# Patient Record
Sex: MALE | Race: WHITE | NOT HISPANIC OR LATINO | Employment: OTHER | URBAN - METROPOLITAN AREA
[De-identification: names, ages, dates, MRNs, and addresses within clinical notes are randomized per-mention and may not be internally consistent; named-entity substitution may affect disease eponyms.]

---

## 2017-01-09 ENCOUNTER — OFFICE VISIT (OUTPATIENT)
Dept: FAMILY MEDICINE | Facility: CLINIC | Age: 62
End: 2017-01-09

## 2017-01-09 VITALS
WEIGHT: 254 LBS | DIASTOLIC BLOOD PRESSURE: 76 MMHG | BODY MASS INDEX: 36.36 KG/M2 | RESPIRATION RATE: 20 BRPM | HEART RATE: 105 BPM | HEIGHT: 70 IN | OXYGEN SATURATION: 94 % | SYSTOLIC BLOOD PRESSURE: 128 MMHG | TEMPERATURE: 98.1 F

## 2017-01-09 DIAGNOSIS — M62.830 BACK MUSCLE SPASM: ICD-10-CM

## 2017-01-09 DIAGNOSIS — R07.0 THROAT PAIN: Primary | ICD-10-CM

## 2017-01-09 LAB — S PYO AG THROAT QL IA.RAPID: NORMAL

## 2017-01-09 PROCEDURE — 87880 STREP A ASSAY W/OPTIC: CPT | Performed by: PHYSICIAN ASSISTANT

## 2017-01-09 PROCEDURE — 99213 OFFICE O/P EST LOW 20 MIN: CPT | Performed by: PHYSICIAN ASSISTANT

## 2017-01-09 PROCEDURE — 87070 CULTURE OTHR SPECIMN AEROBIC: CPT | Performed by: PHYSICIAN ASSISTANT

## 2017-01-09 RX ORDER — CYCLOBENZAPRINE HCL 5 MG
5 TABLET ORAL 3 TIMES DAILY PRN
Qty: 30 TABLET | Refills: 0 | Status: SHIPPED | OUTPATIENT
Start: 2017-01-09 | End: 2018-04-23

## 2017-01-09 NOTE — Clinical Note
[facility]  [place] [facility] [address]  [place]  [address]  [phone]              Return to  Work Release    Date: 1/9/2017      Name: Sarthak Berrios                       YOB: 1955    The patient was seen at [facility]    Please excuse from work responsibilities 1/6 and 1/7.    He may return to work without restrictions 1/9/2017              _________________________  Flakita Rodriguez PA-C

## 2017-01-09 NOTE — NURSING NOTE
Sarthak Berrios is here for a sore throat and also diarrhea.    Questioned patient about current smoking habits.  Pt. has never smoked.  Body mass index is 36.2 kg/(m^2).  BP Cuff right  Arm  L  Cuff  PULSE regular  My Chart: active  CLASSIFICATION OF OVERWEIGHT AND OBESITY BY BMI                        Obesity Class           BMI(kg/m2)  Underweight                                    < 18.5  Normal                                         18.5-24.9  Overweight                                     25.0-29.9  OBESITY                     I                  30.0-34.9                             II                 35.0-39.9  EXTREME OBESITY             III                >40                            Patient's  BMI Body mass index is 36.2 kg/(m^2).  http://hin.nhlbi.nih.gov/menuplanner/menu.cgi    Pre-visit planning  Immunizations - up to date  Colonoscopy - is up to date  Mammogram -   Asthma -   PHQ9 -    TIM-7 -

## 2017-01-09 NOTE — PROGRESS NOTES
"CC: Throat pain, diarrhea    History:  Sarthak and his wife both came home Friday from work feeling not well. Throat pain from 2/5 with 5 being the worst pain. Had diarrhea at first, but this has resolved. Some low back pain with coughing. Has been coughing, mostly clear phlegm. No fevers, sweats, chills. Some nasal drainage, especially at night. Sarthak was here to see Dr. Styles 12/1 with the same symptoms. Was recommended to use albuterol and Flovent. Today he returned with the same symptoms.     Both had a flu shot.     Has been using Nyquil and Dayquil. Ibuprofen more for headaches.     PMH, MEDICATIONS, ALLERGIES, SOCIAL AND FAMILY HISTORY in [facility] and reviewed by me personally.      ROS negative other than the symptoms noted above in the HPI.        Examination   /76 mmHg  Pulse 105  Temp(Src) 98.1  F (36.7  C) (Oral)  Resp 20  Ht 1.784 m (5' 10.25\")  Wt 115.214 kg (254 lb)  BMI 36.20 kg/m2  SpO2 94%       Constitutional: Sitting comfortably, in no acute distress. Vital signs noted  Eyes: pupils equal round reactive to light and accomodation, extra ocular movements intact  Ears: external canals and TMs free of abnormalities  Nose: patent, without mucosal abnormalities  Mouth and throat: without erythema or lesions of the mucosa  Neck:  no adenopathy, trachea midline and normal to palpation  Cardiovascular:  regular rate and rhythm, no murmurs, clicks, or gallops  Respiratory:  normal respiratory rate and rhythm, lungs clear to auscultation  SKIN: No jaundice/pallor/rash.   Psychiatric: mentation appears normal and affect normal/bright        A/P    ICD-10-CM    1. Throat pain R07.0 RAPID STREP (BFP)     THROAT CULTURE (BFP)   2. Back muscle spasm M62.830 cyclobenzaprine (FLEXERIL) 5 MG tablet       DISCUSSION. Rapid strep test negative today. Will culture to confirm. Recommended he continue using over the counter cough suppressants and he should also use albuterol as needed. I also gave him some Flexeril " to use to help with his lower back spasm.     follow up visit: As needed    Flakita Rodriguez PA-C  [facility]

## 2017-01-11 LAB — THROAT CULTURE: NORMAL

## 2017-10-11 DIAGNOSIS — J45.30 MILD PERSISTENT ASTHMA WITHOUT COMPLICATION: ICD-10-CM

## 2017-10-11 RX ORDER — FLUTICASONE PROPIONATE 110 UG/1
2 AEROSOL, METERED RESPIRATORY (INHALATION) 2 TIMES DAILY
Qty: 1 INHALER | Refills: 0 | Status: SHIPPED | OUTPATIENT
Start: 2017-10-11 | End: 2018-04-23

## 2017-10-11 NOTE — TELEPHONE ENCOUNTER
Patient called stating that Dr. Styles told him that if he ever needs refills of his inhalers to just call and he will refill them without asking him to come and be seen.  I informed him that since it has been almost a year there is a chance that he might have to come to be seen because of the amount of time since his last visit for this condition. He says that it would be way too hard to come in and that he just doesn't have the time and he only gets 1 weekday off a week. I told him I would send the request to Dr. Styles and see what he decides and I would call and LVM on his phone letting him know what Dr. Styles says.    Pending Prescriptions:                       Disp   Refills    fluticasone (FLOVENT HFA) 110 MCG/ACT Inh*1 Inha*0            Sig: Inhale 2 puffs into the lungs 2 times daily    Dr. Styles please review and advise.    Thank You,  Naomy Seo CMA        Ok to LVM on patient's phone letting him know what Dr. Styles says.  Phone: [phone]

## 2017-10-11 NOTE — TELEPHONE ENCOUNTER
Spoke with patient and informed him that we sent a refill but this is something that he needs to be seen once a year for. He is ok with this and plans on coming for a full physical sometime soon, he was just not able to come for a little while due to his schedule being so busy.    Patient Call Back Info:  [phone] (home)     Naomy Seo [facility]

## 2017-11-18 ENCOUNTER — NURSE TRIAGE (OUTPATIENT)
Dept: NURSING | Facility: CLINIC | Age: 62
End: 2017-11-18

## 2017-11-20 ENCOUNTER — OFFICE VISIT (OUTPATIENT)
Dept: FAMILY MEDICINE | Facility: CLINIC | Age: 62
End: 2017-11-20

## 2017-11-20 VITALS
RESPIRATION RATE: 16 BRPM | SYSTOLIC BLOOD PRESSURE: 138 MMHG | OXYGEN SATURATION: 98 % | HEART RATE: 76 BPM | DIASTOLIC BLOOD PRESSURE: 88 MMHG | TEMPERATURE: 98 F

## 2017-11-20 DIAGNOSIS — E66.09 OBESITY DUE TO EXCESS CALORIES WITHOUT SERIOUS COMORBIDITY, UNSPECIFIED CLASSIFICATION: ICD-10-CM

## 2017-11-20 DIAGNOSIS — Z96.653 HISTORY OF TOTAL BILATERAL KNEE REPLACEMENT: ICD-10-CM

## 2017-11-20 DIAGNOSIS — S33.8XXA SPRAIN OF ILIOLUMBAR LIGAMENT, INITIAL ENCOUNTER: Primary | ICD-10-CM

## 2017-11-20 PROCEDURE — 99214 OFFICE O/P EST MOD 30 MIN: CPT | Performed by: FAMILY MEDICINE

## 2017-11-20 NOTE — NURSING NOTE
Patient is here for low back pain - This started Friday am - needs a note for work.  Pre-Visit Screening not done today.  Pulse - regular  My Chart - accepts    CLASSIFICATION OF OVERWEIGHT AND OBESITY BY BMI                         Obesity Class           BMI(kg/m2)  Underweight                                    < 18.5  Normal                                         18.5-24.9  Overweight                                     25.0-29.9  OBESITY                     I                  30.0-34.9                              II                 35.0-39.9  EXTREME OBESITY             III                >40                             Patient's  BMI There is no height or weight on file to calculate BMI.  http://hin.nhlbi.nih.gov/menuplanner/menu.cgi  Questioned patient about current smoking habits.  Pt. has never smoked.  The patient has verbalized that it is ok to leave a detailed voice message on the patient's cell phone with results/recommendations from this visit.

## 2017-11-20 NOTE — PATIENT INSTRUCTIONS
Ice    Ibuprofen    Flexeril    Rehab stretches/ exercises to begin immediately and given in writing with illustrations.    Schedule PT for strengthening  Weight loss

## 2017-11-20 NOTE — LETTER
November 20, 2017      Sarthak Berrios  [address] [address]        To Whom It May Concern:    Sarthak Berrios  was seen on 11/20/2017 for our first visit together. He has been struggling with low back pain severe onset Friday 11/17/2017.     I evaluated his back pain today and started a therapy plan.  Please excuse him  until Saturday November 25 due to injury. He can return to work at that time without restrictions.        Sincerely,        Ro Hernandes MD

## 2017-11-20 NOTE — MR AVS SNAPSHOT
After Visit Summary   11/20/2017    Sarthak Berrios    MRN: [id number]           Patient Information     Date Of Birth          1955        Visit Information        Provider Department      11/20/2017 12:30 PM Ro Hernandes MD [facility]        Today's Diagnoses     Sprain of iliolumbar ligament, initial encounter    -  1    Obesity due to excess calories without serious comorbidity, unspecified classification          Care Instructions    Ice    Ibuprofen    Flexeril    Rehab stretches/ exercises to begin immediately and given in writing with illustrations.    Schedule PT for strengthening  Weight loss          Follow-ups after your visit        Additional Services     [facility] PT, HAND, AND CHIROPRACTIC REFERRAL       **This order will print in the [facility] Scheduling Office**    Physical Therapy, Hand Therapy and Chiropractic Care are available through:    *[facility]  *[facility]  *[facility]    Call one number to schedule at any of the above locations: [phone].    Your provider has referred you to: [facility] - PT and/or Chiropractic Care determined by clinical presentation at [facility] or [facility] Initial Visit    Indication/Reason for Referral: Low Back Pain  Onset of Illness: repetitive issue/ last incident Friday 11/17/2017 with minimal injury  Therapy Orders: Evaluate and Treat  Special Programs: None  Special Request: None      Additional Comments for the Therapist or Chiropractor: Back strengthening program/ weight loss    Please be aware that coverage of these services is subject to the terms and limitations of your health insurance plan.  Call member services at your health plan with any benefit or coverage questions.      Please bring the following to your appointment:    *Your personal calendar for scheduling future appointments  *Comfortable clothing                  Who to contact     If you have  questions or need follow up information about today's clinic visit or your schedule please contact [facility] directly at [phone].  Normal or non-critical lab and imaging results will be communicated to you by MyChart, letter or phone within 4 business days after the clinic has received the results. If you do not hear from us within 7 days, please contact the clinic through MyChart or phone. If you have a critical or abnormal lab result, we will notify you by phone as soon as possible.  Submit refill requests through [employer] or call your pharmacy and they will forward the refill request to us. Please allow 3 business days for your refill to be completed.          Additional Information About Your Visit        PrePayMeharFood on the Table Information     [employer] gives you secure access to your electronic health record. If you see a primary care provider, you can also send messages to your care team and make appointments. If you have questions, please call your primary care clinic.  If you do not have a primary care provider, please call [phone] and they will assist you.        Care EveryWhere ID     This is your Care EveryWhere ID. This could be used by other organizations to access your [place] medical records  [id number]        Your Vitals Were     Pulse Temperature Respirations Pulse Oximetry          76 98  F (36.7  C) (Oral) 16 98%         Blood Pressure from Last 3 Encounters:   11/20/17 138/88   01/09/17 128/76   12/01/16 118/82    Weight from Last 3 Encounters:   01/09/17 115.2 kg (254 lb)   12/01/16 115.6 kg (254 lb 12.8 oz)   11/17/15 112.9 kg (249 lb)              We Performed the Following     [facility] PT, HAND, AND CHIROPRACTIC REFERRAL        Primary Care Provider Office Phone # Fax #    Paul Styles [phone]       [address] [address]        Equal Access to Services     SUSY DAO : chepe Garcia,  joann carbajal erikhugo butleraakarolyn ah. So [facility] [phone].    ATENCIÓN: Si disha kaiser, tiene a davis disposición servicios gratuitos de asistencia lingüística. Morales al [phone].    We comply with applicable federal civil rights laws and [place] laws. We do not discriminate on the basis of race, color, national origin, age, disability, sex, sexual orientation, or gender identity.            Thank you!     Thank you for choosing [facility]  for your care. Our goal is always to provide you with excellent care. Hearing back from our patients is one way we can continue to improve our services. Please take a few minutes to complete the written survey that you may receive in the mail after your visit with us. Thank you!             Your Updated Medication List - Protect others around you: Learn how to safely use, store and throw away your medicines at www.disposemymeds.org.          This list is accurate as of: 11/20/17  2:03 PM.  Always use your most recent med list.                   Brand Name Dispense Instructions for use Diagnosis    albuterol 108 (90 BASE) MCG/ACT Inhaler    PROAIR HFA/PROVENTIL HFA/VENTOLIN HFA    1 Inhaler    Inhale 2 puffs into the lungs every 6 hours as needed for shortness of breath / dyspnea    Mild persistent asthma without complication       cyclobenzaprine 5 MG tablet    FLEXERIL    30 tablet    Take 1 tablet (5 mg) by mouth 3 times daily as needed for muscle spasms (Take 1 tablet by mouth as need for muscle pain)    Back muscle spasm       fluticasone 110 MCG/ACT Inhaler    FLOVENT HFA    1 Inhaler    Inhale 2 puffs into the lungs 2 times daily    Mild persistent asthma without complication       ibuprofen 800 MG tablet    ADVIL/MOTRIN    30 tablet    Take 1 tablet by mouth every 8 hours as needed for pain.    Sprain of lumbar region       Multi-vitamin Tabs tablet      Take 1 tablet by mouth daily

## 2017-11-20 NOTE — PROGRESS NOTES
SUBJECTIVE:   The patients complains of low back pain for 3 days, positional with bending or lifting, without radiation down the legs. Precipitating factors: putting his socks on had a left sided back twinge. So sore iced, took flexeril and stayed in bed. Ibuprofen as well/ chiropractor in the past.    Called ER at [facility] who recommended he come in/ did not do that. Started walking on Saturday and felt better. Missed 3 days of work and need note for missed work  Prior history of back problems: one a year wrong move with a muscular issue/ no imaging. Lasts 2-4 days and does well. There is no numbness/tingling in the right or left leg.    Recent knee replacement doing well.  Asthma issues with cold weather.     for work.           OBJECTIVE:  Appears well, in no apparent distress.  Protuberant abdomen with obesity.  Vital signs are normal. Lumbo-sacral spine area reveals no local tenderness or mass.  Painful and reduced LS ROM noted. Straight leg raise is negative at 90 degrees on right and left. DTR's, motor strength and sensation normal, including heel and toe gait.  Peripheral pulses are palpable. Lumbar spine X-Ray are not indicated.    ASSESSMENT:   Lumbar strain    PLAN:  Discussed treatment plan of prn NSAID's and back care exercise program, given in writing. Intermittent rest, proper lifting with avoidance of heavy lifting and intermittent use of heat discussed - avoid sleeping on a heating pad. Consider Physical Therapy now due to recurrence issues. Xray studies if not improving. Call or return to clinic prn if these symptoms worsen or fail to improve as anticipated. See note for work.

## 2017-12-04 DIAGNOSIS — J45.30 MILD PERSISTENT ASTHMA WITHOUT COMPLICATION: ICD-10-CM

## 2017-12-04 RX ORDER — ALBUTEROL SULFATE 90 UG/1
AEROSOL, METERED RESPIRATORY (INHALATION)
Qty: 1 INHALER | Refills: 0 | COMMUNITY
Start: 2017-12-04 | End: 2019-12-31

## 2017-12-04 NOTE — TELEPHONE ENCOUNTER
Ok refill of Ventolin for one month only called into [facility]. Pt needs non fasting OV for further refills.     Thanks,Irene    [phone], Per Dr Styles needs to be seen yearly for medication review

## 2018-01-11 ENCOUNTER — OFFICE VISIT (OUTPATIENT)
Dept: FAMILY MEDICINE | Facility: CLINIC | Age: 63
End: 2018-01-11

## 2018-01-11 VITALS
TEMPERATURE: 97.7 F | HEART RATE: 86 BPM | DIASTOLIC BLOOD PRESSURE: 82 MMHG | SYSTOLIC BLOOD PRESSURE: 126 MMHG | HEIGHT: 70 IN | OXYGEN SATURATION: 92 % | BODY MASS INDEX: 36.85 KG/M2 | WEIGHT: 257.4 LBS

## 2018-01-11 DIAGNOSIS — Z80.42 FAMILY HISTORY OF MALIGNANT NEOPLASM OF PROSTATE: ICD-10-CM

## 2018-01-11 DIAGNOSIS — Z11.59 NEED FOR HEPATITIS C SCREENING TEST: ICD-10-CM

## 2018-01-11 DIAGNOSIS — Z00.00 ROUTINE GENERAL MEDICAL EXAMINATION AT A HEALTH CARE FACILITY: Primary | ICD-10-CM

## 2018-01-11 DIAGNOSIS — J45.30 MILD PERSISTENT ASTHMA, UNSPECIFIED WHETHER COMPLICATED: ICD-10-CM

## 2018-01-11 PROCEDURE — 84153 ASSAY OF PSA TOTAL: CPT | Mod: 90 | Performed by: FAMILY MEDICINE

## 2018-01-11 PROCEDURE — 36415 COLL VENOUS BLD VENIPUNCTURE: CPT | Performed by: FAMILY MEDICINE

## 2018-01-11 PROCEDURE — 99396 PREV VISIT EST AGE 40-64: CPT | Performed by: FAMILY MEDICINE

## 2018-01-11 PROCEDURE — 80061 LIPID PANEL: CPT | Mod: 90 | Performed by: FAMILY MEDICINE

## 2018-01-11 PROCEDURE — 86803 HEPATITIS C AB TEST: CPT | Mod: 90 | Performed by: FAMILY MEDICINE

## 2018-01-11 PROCEDURE — 80053 COMPREHEN METABOLIC PANEL: CPT | Mod: 90 | Performed by: FAMILY MEDICINE

## 2018-01-11 NOTE — MR AVS SNAPSHOT
After Visit Summary   1/11/2018    Sarthak Berrios    MRN: [id number]           Patient Information     Date Of Birth          1955        Visit Information        Provider Department      1/11/2018 9:30 AM Paul Styles MD [facility]        Today's Diagnoses     Routine general medical examination at a health care facility    -  1    Mild persistent asthma, unspecified whether complicated        Need for hepatitis C screening test        Family history of malignant neoplasm of prostate           Follow-ups after your visit        Follow-up notes from your care team     Return in about 1 year (around 1/11/2019).      Who to contact     If you have questions or need follow up information about today's clinic visit or your schedule please contact [facility] directly at [phone].  Normal or non-critical lab and imaging results will be communicated to you by MyChart, letter or phone within 4 business days after the clinic has received the results. If you do not hear from us within 7 days, please contact the clinic through MyChart or phone. If you have a critical or abnormal lab result, we will notify you by phone as soon as possible.  Submit refill requests through [employer] or call your pharmacy and they will forward the refill request to us. Please allow 3 business days for your refill to be completed.          Additional Information About Your Visit        MyChart Information     [employer] gives you secure access to your electronic health record. If you see a primary care provider, you can also send messages to your care team and make appointments. If you have questions, please call your primary care clinic.  If you do not have a primary care provider, please call [phone] and they will assist you.        Care EveryWhere ID     This is your Care EveryWhere ID. This could be used by other organizations to access your [facility]  "records  [id number]        Your Vitals Were     Pulse Temperature Height Pulse Oximetry BMI (Body Mass Index)       86 97.7  F (36.5  C) (Oral) 1.765 m (5' 9.5\") 92% 37.47 kg/m2        Blood Pressure from Last 3 Encounters:   01/11/18 126/82   11/20/17 138/88   01/09/17 128/76    Weight from Last 3 Encounters:   01/11/18 116.8 kg (257 lb 6.4 oz)   01/09/17 115.2 kg (254 lb)   12/01/16 115.6 kg (254 lb 12.8 oz)              We Performed the Following     Asthma Action Plan (AAP)     COMPREHENSIVE METABOLIC PANEL (QUEST) XCMP     HCL PSA, SCREENING (QUEST)     Hepatits C antibody (QUEST)     Lipid Profile (QUEST)     VENOUS COLLECTION        Primary Care Provider Office Phone # Fax #    Paul Adalid Styles [phone] [address] [address]        Equal Access to Services     JANINA DAO : Hadii aad ku hadasho Soomaali, waaxda luqadaha, qaybta kaalmada adeegyada, waxay walterin hayerma wooten . So [facility] [phone].    ATENCIÓN: Si habla español, tiene a davis disposición servicios gratuitos de asistencia lingüística. [facility] [phone].    We comply with applicable federal civil rights laws and [place] laws. We do not discriminate on the basis of race, color, national origin, age, disability, sex, sexual orientation, or gender identity.            Thank you!     Thank you for choosing [facility]  for your care. Our goal is always to provide you with excellent care. Hearing back from our patients is one way we can continue to improve our services. Please take a few minutes to complete the written survey that you may receive in the mail after your visit with us. Thank you!             Your Updated Medication List - Protect others around you: Learn how to safely use, store and throw away your medicines at www.disposemymeds.org.          This list is accurate as of: 1/11/18 10:08 AM.  Always use your most recent med list.       "             Brand Name Dispense Instructions for use Diagnosis    cyclobenzaprine 5 MG tablet    FLEXERIL    30 tablet    Take 1 tablet (5 mg) by mouth 3 times daily as needed for muscle spasms (Take 1 tablet by mouth as need for muscle pain)    Back muscle spasm       fluticasone 110 MCG/ACT Inhaler    FLOVENT HFA    1 Inhaler    Inhale 2 puffs into the lungs 2 times daily    Mild persistent asthma without complication       ibuprofen 800 MG tablet    ADVIL/MOTRIN    30 tablet    Take 1 tablet by mouth every 8 hours as needed for pain.    Sprain of lumbar region       Multi-vitamin Tabs tablet      Take 1 tablet by mouth daily        VENTOLIN  (90 BASE) MCG/ACT Inhaler   Generic drug:  albuterol     1 Inhaler    INHALE 2 PUFFS INTO THE LUNGS EVERY 6 HOURS AS NEEDED FOR SHORTNESS OF BREATH OR DIFFICULT BREATHING    Mild persistent asthma without complication

## 2018-01-11 NOTE — LETTER
My Asthma Action Plan  Name: Sarthak Berrios   YOB: 1955  Date: 1/11/2018   My doctor: Paul Styles MD   My clinic: [facility]        My Control Medicine: Fluticasone propionate (Flovent) -   mcg 2 puffs BID  My Rescue Medicine: Albuterol (Proair/Ventolin/Proventil) inhaler as needed   My Asthma Severity: intermittent  Avoid your asthma triggers: upper respiratory infections and exercise or sports               GREEN ZONE   Good Control    I feel good    No cough or wheeze    Can work, sleep and play without asthma symptoms       Take your asthma control medicine every day.     1. If exercise triggers your asthma, take your rescue medication    15 minutes before exercise or sports, and    During exercise if you have asthma symptoms  2. Spacer to use with inhaler: If you have a spacer, make sure to use it with your inhaler             YELLOW ZONE Getting Worse  I have ANY of these:    I do not feel good    Cough or wheeze    Chest feels tight    Wake up at night   1. Keep taking your Green Zone medications  2. Start taking your rescue medicine:    every 20 minutes for up to 1 hour. Then every 4 hours for 24-48 hours.  3. If you stay in the Yellow Zone for more than 12-24 hours, contact your doctor.  4. If you do not return to the Green Zone in 12-24 hours or you get worse, start taking your oral steroid medicine if prescribed by your provider.           RED ZONE Medical Alert - Get Help  I have ANY of these:    I feel awful    Medicine is not helping    Breathing getting harder    Trouble walking or talking    Nose opens wide to breathe       1. Take your rescue medicine NOW  2. If your provider has prescribed an oral steroid medicine, start taking it NOW  3. Call your doctor NOW  4. If you are still in the Red Zone after 20 minutes and you have not reached your doctor:    Take your rescue medicine again and    Call 911 or go to the emergency room right  away    See your regular doctor within 2 weeks of an Emergency Room or Urgent Care visit for follow-up treatment.        Electronically signed by: Paul Styles, January 11, 2018    Annual Reminders:  Meet with Asthma Educator,  Flu Shot in the Fall, consider Pneumonia Vaccination for patients with asthma (aged 19 and older).    [id number] - [place], [place] - [address] AT [facility] OF [address]                    Asthma Triggers  How To Control Things That Make Your Asthma Worse    Triggers are things that make your asthma worse.  Look at the list below to help you find your triggers and what you can do about them.  You can help prevent asthma flare-ups by staying away from your triggers.      Trigger                                                          What you can do   Cigarette Smoke  Tobacco smoke can make asthma worse. Do not allow smoking in your home, car or around you.  Be sure no one smokes at a child s day care or school.  If you smoke, ask your health care provider for ways to help you quit.  Ask family members to quit too.  Ask your health care provider for a referral to Quit Plan to help you quit smoking, or call 6-116-684-PLAN.     Colds, Flu, Bronchitis  These are common triggers of asthma. Wash your hands often.  Don t touch your eyes, nose or mouth.  Get a flu shot every year.     Dust Mites  These are tiny bugs that live in cloth or carpet. They are too small to see. Wash sheets and blankets in hot water every week.   Encase pillows and mattress in dust mite proof covers.  Avoid having carpet if you can. If you have carpet, vacuum weekly.   Use a dust mask and HEPA vacuum.   Pollen and Outdoor Mold  Some people are allergic to trees, grass, or weed pollen, or molds. Try to keep your windows closed.  Limit time out doors when pollen count is high.   Ask you health care provider about taking medicine during allergy season.     Animal Dander  Some people  are allergic to skin flakes, urine or saliva from pets with fur or feathers. Keep pets with fur or feathers out of your home.    If you can t keep the pet outdoors, then keep the pet out of your bedroom.  Keep the bedroom door closed.  Keep pets off cloth furniture and away from stuffed toys.     Mice, Rats, and Cockroaches  Some people are allergic to the waste from these pests.   Cover food and garbage.  Clean up spills and food crumbs.  Store grease in the refrigerator.   Keep food out of the bedroom.   Indoor Mold  This can be a trigger if your home has high moisture. Fix leaking faucets, pipes, or other sources of water.   Clean moldy surfaces.  Dehumidify basement if it is damp and smelly.   Smoke, Strong Odors, and Sprays  These can reduce air quality. Stay away from strong odors and sprays, such as perfume, powder, hair spray, paints, smoke incense, paint, cleaning products, candles and new carpet.   Exercise or Sports  Some people with asthma have this trigger. Be active!  Ask your doctor about taking medicine before sports or exercise to prevent symptoms.    Warm up for 5-10 minutes before and after sports or exercise.     Other Triggers of Asthma  Cold air:  Cover your nose and mouth with a scarf.  Sometimes laughing or crying can be a trigger.  Some medicines and food can trigger asthma.

## 2018-01-11 NOTE — PROGRESS NOTES
SUBJECTIVE:   CC: Sarthak Beriros is an 62 year old male who presents for preventative health visit.     Healthy Habits:    Do you get at least three servings of calcium containing foods daily (dairy, green leafy vegetables, etc.)? yes    Amount of exercise or daily activities, outside of work: 3 day(s) per week    Problems taking medications regularly No    Medication side effects: No    Have you had an eye exam in the past two years? yes    Do you see a dentist twice per year? yes    Do you have sleep apnea, excessive snoring or daytime drowsiness?no             Today's PHQ-2 Score: PHQ-2 ( 1999 Pfizer) 1/11/2018   Q1: Little interest or pleasure in doing things 0   Q2: Feeling down, depressed or hopeless 0   PHQ-2 Score 0         Abuse: Current or Past(Physical, Sexual or Emotional)- No  Do you feel safe in your environment - Yes  Social History   Substance Use Topics     Smoking status: Never Smoker     Smokeless tobacco: Never Used     Alcohol use 1.5 oz/week     3 Standard drinks or equivalent per week      Comment: twice a week      If you drink alcohol do you typically have >3 drinks per day or >7 drinks per week? No                      Last PSA:   [facility] PSA   Date Value Ref Range Status   10/29/2014 1.8 < OR = 4.0 ng/mL Final     Comment:        This test was performed using the Siemens  chemiluminescent method. Values obtained from  different assay methods cannot be used  interchangeably. PSA levels, regardless of  value, should not be interpreted as absolute  evidence of the presence or absence of disease.            Reviewed orders with patient. Reviewed health maintenance and updated orders accordingly - Yes  BP Readings from Last 3 Encounters:   01/11/18 126/82   11/20/17 138/88   01/09/17 128/76    Wt Readings from Last 3 Encounters:   01/11/18 116.8 kg (257 lb 6.4 oz)   01/09/17 115.2 kg (254 lb)   12/01/16 115.6 kg (254 lb 12.8 oz)                  Patient Active Problem List   Diagnosis     Family  history of malignant neoplasm of prostate     Osteoarthritis     FAMILY HX GI MALIGNANCY     ACP (advance care planning)     Health Care Home     Mild persistent asthma     Degenerative arthritis of knee     History of total bilateral knee replacement     Past Surgical History:   Procedure Laterality Date     ARTHROPLASTY KNEE Left 11/17/2015    Procedure: ARTHROPLASTY KNEE;  Surgeon: Alfie Ingram MD;  Location: [facility] OR     AS TOTAL KNEE ARTHROPLASTY Right      HC COLONOSCOPY THRU STOMA, DIAGNOSTIC  2009     HC KNEE SCOPE, DIAGNOSTIC  1999    Arthroscopy, Knee   Right  Dr. Ingram      REPAIR UMBILICAL LILLIAM,5+Y/O,REDUC  1996     LIGATN/STRIP LONG OR SHORT SAPHEN  2000       Social History   Substance Use Topics     Smoking status: Never Smoker     Smokeless tobacco: Never Used     Alcohol use 1.5 oz/week     3 Standard drinks or equivalent per week      Comment: twice a week     Family History   Problem Relation Age of Onset     Alcohol/Drug Father      CANCER Maternal Grandfather      Prostate     CANCER Brother      Bladder     Cardiovascular No family hx of      DIABETES No family hx of      Cancer - colorectal No family hx of          Current Outpatient Prescriptions   Medication Sig Dispense Refill     multivitamin, therapeutic with minerals (MULTI-VITAMIN) TABS Take 1 tablet by mouth daily       ibuprofen (ADVIL,MOTRIN) 800 MG tablet Take 1 tablet by mouth every 8 hours as needed for pain. 30 tablet 1     VENTOLIN  (90 BASE) MCG/ACT Inhaler INHALE 2 PUFFS INTO THE LUNGS EVERY 6 HOURS AS NEEDED FOR SHORTNESS OF BREATH OR DIFFICULT BREATHING 1 Inhaler 0     fluticasone (FLOVENT HFA) 110 MCG/ACT Inhaler Inhale 2 puffs into the lungs 2 times daily 1 Inhaler 0     cyclobenzaprine (FLEXERIL) 5 MG tablet Take 1 tablet (5 mg) by mouth 3 times daily as needed for muscle spasms (Take 1 tablet by mouth as need for muscle pain) 30 tablet 0     Allergies   Allergen Reactions     No Known Allergies      Recent  Labs   Lab Test  05/04/15   1136  10/29/14   1409 12/03/11   LDL   --   78  115   HDL   --   40  49   TRIG   --   102  61   ALT   --   54*  38   CR  0.99  0.99  1.09   GFRESTIMATED  84  84  76   POTASSIUM  4.4  4.1  4.3              Reviewed and updated as needed this visit by clinical staffTobacco  Problems         Reviewed and updated as needed this visit by Provider        Past Medical History:   Diagnosis Date     Arthritis      Family history of malignant neoplasm of prostate     MGF     FAMILY HX GI MALIGNANCY 9/23/2006    MGF had colon cancer     Mild persistent asthma 1/7/2015     Other chronic pain     JOint pain for over 10 years     Uncomplicated asthma       Past Surgical History:   Procedure Laterality Date     ARTHROPLASTY KNEE Left 11/17/2015    Procedure: ARTHROPLASTY KNEE;  Surgeon: Alfie Ingram MD;  Location: [facility] OR     AS TOTAL KNEE ARTHROPLASTY Right       COLONOSCOPY THRU STOMA, DIAGNOSTIC  2009      KNEE SCOPE, DIAGNOSTIC  1999    Arthroscopy, Knee   Right  Dr. Ingram      REPAIR UMBILICAL LILLIAM,5+Y/O,REDUC  1996     LIGATN/STRIP LONG OR SHORT SAPHEN  2000       ROS:  C: NEGATIVE for fever, chills, change in weight  I: NEGATIVE for worrisome rashes, moles or lesions  E: NEGATIVE for vision changes or irritation  ENT: NEGATIVE for ear, mouth and throat problems  R: NEGATIVE for significant cough or SOB  CV: NEGATIVE for chest pain, palpitations or peripheral edema  GI: NEGATIVE for nausea, abdominal pain, heartburn, or change in bowel habits   male: negative for dysuria, hematuria, decreased urinary stream, erectile dysfunction, urethral discharge  M: NEGATIVE for significant arthralgias or myalgia  N: NEGATIVE for weakness, dizziness or paresthesias  E: NEGATIVE for temperature intolerance, skin/hair changes  H: NEGATIVE for bleeding problems  P: NEGATIVE for changes in mood or affect    OBJECTIVE:   /82 (BP Location: Left arm, Patient Position: Chair, Cuff Size: Adult Large)  " Pulse 86  Temp 97.7  F (36.5  C) (Oral)  Ht 1.765 m (5' 9.5\")  Wt 116.8 kg (257 lb 6.4 oz)  SpO2 92%  BMI 37.47 kg/m2  EXAM:  GENERAL: healthy, alert and no distress  EYES: Eyes grossly normal to inspection, PERRL and conjunctivae and sclerae normal  HENT: ear canals and TM's normal, nose and mouth without ulcers or lesions  NECK: no adenopathy, no asymmetry, masses, or scars and thyroid normal to palpation  RESP: lungs clear to auscultation - no rales, rhonchi or wheezes  CV: regular rate and rhythm, normal S1 S2, no S3 or S4, no murmur, click or rub, no peripheral edema and peripheral pulses strong  ABDOMEN: soft, nontender, no hepatosplenomegaly, no masses and bowel sounds normal   (male): normal male genitalia without lesions or urethral discharge, no hernia  RECTAL (male): deferred  MS: no gross musculoskeletal defects noted, no edema  SKIN: no suspicious lesions or rashes  SKIN: no suspicious lesions or rashes and keratoses - seborrheic  NEURO: Normal strength and tone, mentation intact and speech normal  PSYCH: mentation appears normal, affect normal/bright    ASSESSMENT/PLAN:   (Z00.00) Routine general medical examination at a health care facility  (primary encounter diagnosis)  Comment: discussed preventitive healthcare   Plan: Lipid Profile ([facility]), COMPREHENSIVE METABOLIC         PANEL ([facility]) XCMP, Hepatits C antibody         ([facility]), HCL PSA, SCREENING ([facility]), VENOUS         COLLECTION        Continue to work on healthy diet and exercise, discussed healthy habits     (J45.30) Mild persistent asthma, unspecified whether complicated  Comment: not using inhalers-feels fine-discussed lower sats today  Plan: recommend he start flovent and see if breahting improves in any way    (Z11.59) Need for hepatitis C screening test  Comment:   Plan: Hepatits C antibody ([facility]), VENOUS COLLECTION            (Z80.42) Family history of malignant neoplasm of prostate  Comment:   Plan: HCL PSA, SCREENING ([facility]), " "VENOUS COLLECTION              COUNSELING:  Reviewed preventive health counseling, as reflected in patient instructions       Regular exercise       Healthy diet/nutrition       Vision screening       Hearing screening       Consider Hep C screening for patients born between 1945 and 1965       Colon cancer screening       Prostate cancer screening    BP Screening:   Last 3 BP Readings:    BP Readings from Last 3 Encounters:   01/11/18 126/82   11/20/17 138/88   01/09/17 128/76       The following was recommended to the patient:  Re-screen BP within a year and recommended lifestyle modifications   reports that he has never smoked. He has never used smokeless tobacco.      Estimated body mass index is 37.47 kg/(m^2) as calculated from the following:    Height as of this encounter: 1.765 m (5' 9.5\").    Weight as of this encounter: 116.8 kg (257 lb 6.4 oz).   Weight management plan: Discussed healthy diet and exercise guidelines and patient will follow up in 12 months in clinic to re-evaluate.    Counseling Resources:  ATP IV Guidelines  Pooled Cohorts Equation Calculator  FRAX Risk Assessment  ICSI Preventive Guidelines  Dietary Guidelines for Americans, 2010  USDA's MyPlate  ASA Prophylaxis  Lung CA Screening    Paul Styles MD  [facility]  "

## 2018-01-11 NOTE — NURSING NOTE
Sarthak is here for CPX fasting    Patient is here for a full physical exam.    Pre-Visit Screening :  Immunizations : not up to date -   Colon Screening : is up to date  Mammogram: NA  Asthma Action Test/Plan : NA  PHQ9 :  None  GAD7 :  None  Patient's  BMI Body mass index is 37.47 kg/(m^2).  Questioned patient about current smoking habits.  Pt. has never smoked.  OK to leave a detailed voice message regarding today's visit Yes, phone # [phone]      ETOH screening:  Questions:  1-How often do you have a drink containing alcohol?                             5 times per week(s)  2-How many drinks containing alcohol do you have on a typical day when you are         Drinking?                              1   3- How often do you have 5 or more drinks on one occasion?                              Never

## 2018-01-12 LAB
ABBOTT PSA - QUEST: 2.2 NG/ML
ALBUMIN SERPL-MCNC: 4.4 G/DL (ref 3.6–5.1)
ALBUMIN/GLOB SERPL: 1.7 (CALC) (ref 1–2.5)
ALP SERPL-CCNC: 50 U/L (ref 40–115)
ALT SERPL-CCNC: 79 U/L (ref 9–46)
AST SERPL-CCNC: 42 U/L (ref 10–35)
BILIRUB SERPL-MCNC: 1.3 MG/DL (ref 0.2–1.2)
BUN SERPL-MCNC: 14 MG/DL (ref 7–25)
BUN/CREATININE RATIO: ABNORMAL (CALC) (ref 6–22)
CALCIUM SERPL-MCNC: 9.4 MG/DL (ref 8.6–10.3)
CHLORIDE SERPLBLD-SCNC: 105 MMOL/L (ref 98–110)
CHOLEST SERPL-MCNC: 144 MG/DL
CHOLEST/HDLC SERPL: 3.4 (CALC)
CO2 SERPL-SCNC: 22 MMOL/L (ref 20–31)
CREAT SERPL-MCNC: 1.17 MG/DL (ref 0.7–1.25)
EGFR AFRICAN AMERICAN - QUEST: 78 ML/MIN/1.73M2
GFR SERPL CREATININE-BSD FRML MDRD: 67 ML/MIN/1.73M2
GLOBULIN, CALCULATED - QUEST: 2.6 G/DL (CALC) (ref 1.9–3.7)
GLUCOSE - QUEST: 96 MG/DL (ref 65–99)
HCV AB - QUEST: NORMAL
HDLC SERPL-MCNC: 42 MG/DL
LDLC SERPL CALC-MCNC: 87 MG/DL (CALC)
NONHDLC SERPL-MCNC: 102 MG/DL (CALC)
POTASSIUM SERPL-SCNC: 4.4 MMOL/L (ref 3.5–5.3)
PROT SERPL-MCNC: 7 G/DL (ref 6.1–8.1)
SIGNAL TO CUT OFF - QUEST: 0.06
SODIUM SERPL-SCNC: 141 MMOL/L (ref 135–146)
TRIGL SERPL-MCNC: 63 MG/DL

## 2018-01-12 ASSESSMENT — ASTHMA QUESTIONNAIRES: ACT_TOTALSCORE: 23

## 2018-03-02 ENCOUNTER — OFFICE VISIT (OUTPATIENT)
Dept: FAMILY MEDICINE | Facility: CLINIC | Age: 63
End: 2018-03-02

## 2018-03-02 VITALS
TEMPERATURE: 97.4 F | BODY MASS INDEX: 36.67 KG/M2 | SYSTOLIC BLOOD PRESSURE: 126 MMHG | WEIGHT: 247.6 LBS | OXYGEN SATURATION: 98 % | HEIGHT: 69 IN | HEART RATE: 83 BPM | DIASTOLIC BLOOD PRESSURE: 84 MMHG

## 2018-03-02 DIAGNOSIS — L82.0 INFLAMED SEBORRHEIC KERATOSIS: Primary | ICD-10-CM

## 2018-03-02 PROCEDURE — 99213 OFFICE O/P EST LOW 20 MIN: CPT | Performed by: FAMILY MEDICINE

## 2018-03-02 NOTE — PROGRESS NOTES
SUBJECTIVE: Sarthak Berrios is a 62 year old male who presents for skin concern.  He noted an itchy , red, slightly tender spot on right calf about a week ago.  He is leaving for [place] soon and wanted to make sure this is OK.  He will be back in a month.  NKI NO fevers. No personal or family history of melanoma    Patient Active Problem List   Diagnosis     Family history of malignant neoplasm of prostate     Osteoarthritis     FAMILY HX GI MALIGNANCY     ACP (advance care planning)     Health Care Home     Mild persistent asthma     Degenerative arthritis of knee     History of total bilateral knee replacement     Past Medical History:   Diagnosis Date     Arthritis      Family history of malignant neoplasm of prostate     MGF     FAMILY HX GI MALIGNANCY 9/23/2006    MGF had colon cancer     Mild persistent asthma 1/7/2015     Other chronic pain     JOint pain for over 10 years     Uncomplicated asthma      Family History   Problem Relation Age of Onset     Alcohol/Drug Father      CANCER Maternal Grandfather      Prostate     CANCER Brother      Bladder     Cardiovascular No family hx of      DIABETES No family hx of      Cancer - colorectal No family hx of      Social History     Social History     Marital status:      Spouse name: Lindy     Number of children: 5     Years of education: 16     Occupational History     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      Social History Main Topics     Smoking status: Never Smoker     Smokeless tobacco: Never Used     Alcohol use 1.5 oz/week     3 Standard drinks or equivalent per week      Comment: twice a week     Drug use: No     Sexual activity: Yes     Partners: Female     Other Topics Concern     Exercise Yes     Seat Belt Yes     Self-Exams Yes     Social History Narrative     Past Surgical History:   Procedure Laterality Date     ARTHROPLASTY KNEE Left 11/17/2015    Procedure: ARTHROPLASTY KNEE;  Surgeon: Alfie Ingram MD;  Location:  OR  "    AS TOTAL KNEE ARTHROPLASTY Right       COLONOSCOPY THRU STOMA, DIAGNOSTIC  2009     HC KNEE SCOPE, DIAGNOSTIC  1999    Arthroscopy, Knee   Right  Dr. Ingram      REPAIR UMBILICAL LILLIAM,5+Y/O,REDUC  1996     LIGATN/STRIP LONG OR SHORT SAPHEN  2000       Current Outpatient Prescriptions on File Prior to Visit:  multivitamin, therapeutic with minerals (MULTI-VITAMIN) TABS Take 1 tablet by mouth daily   VENTOLIN  (90 BASE) MCG/ACT Inhaler INHALE 2 PUFFS INTO THE LUNGS EVERY 6 HOURS AS NEEDED FOR SHORTNESS OF BREATH OR DIFFICULT BREATHING   fluticasone (FLOVENT HFA) 110 MCG/ACT Inhaler Inhale 2 puffs into the lungs 2 times daily   cyclobenzaprine (FLEXERIL) 5 MG tablet Take 1 tablet (5 mg) by mouth 3 times daily as needed for muscle spasms (Take 1 tablet by mouth as need for muscle pain)   ibuprofen (ADVIL,MOTRIN) 800 MG tablet Take 1 tablet by mouth every 8 hours as needed for pain.     No current facility-administered medications on file prior to visit.      Allergies: No known allergies    Immunization History   Administered Date(s) Administered     Influenza (IIV3) PF 11/24/2003     Influenza Vaccine, 3 YRS +, IM (QUADRIVALENT W/PRESERVATIVES) 10/03/2017     TD (ADULT, 7+) 03/28/2003     TDAP Vaccine (Boostrix) 12/03/2011        OBJECTIVE:   /84 (BP Location: Left arm, Patient Position: Chair, Cuff Size: Adult Large)  Pulse 83  Temp 97.4  F (36.3  C) (Oral)  Ht 1.759 m (5' 9.25\")  Wt 112.3 kg (247 lb 9.6 oz)  SpO2 98%  BMI 36.3 kg/m2   Skin: right posterior calf:  Multiple small seb K's, spot of concern reveals a slightly raised, erythematous .5 cm lesion, nontender, no surrounding erythema    ASSESSMENT: /PLAN:   (L82.0) Inflamed seborrheic keratosis  (primary encounter diagnosis)  Comment: pt exam c/w inflamed seb K-lesion likely similar to others nearby-likely was traumatized.  Discussed expected healing  Plan: observe, if does not heal in next 3-4 weeks needs to return for bx-can " schedule  Biopsy without initial consultation in that case    reviewed ABCD's of skin lesion observation, including asymmetry, borders, color, and diameter. Pt. will observe for any of these changes as well as nonhealing sores.

## 2018-03-02 NOTE — MR AVS SNAPSHOT
"              After Visit Summary   3/2/2018    Sarthak Berrios    MRN: [id number]           Patient Information     Date Of Birth          1955        Visit Information        Provider Department      3/2/2018 1:30 PM Paul Styles MD [facility]        Today's Diagnoses     Inflamed seborrheic keratosis    -  1       Follow-ups after your visit        Who to contact     If you have questions or need follow up information about today's clinic visit or your schedule please contact [facility] directly at [phone].  Normal or non-critical lab and imaging results will be communicated to you by [employer], letter or phone within 4 business days after the clinic has received the results. If you do not hear from us within 7 days, please contact the clinic through [employer] or phone. If you have a critical or abnormal lab result, we will notify you by phone as soon as possible.  Submit refill requests through [employer] or call your pharmacy and they will forward the refill request to us. Please allow 3 business days for your refill to be completed.          Additional Information About Your Visit        MyChart Information     [employer] gives you secure access to your electronic health record. If you see a primary care provider, you can also send messages to your care team and make appointments. If you have questions, please call your primary care clinic.  If you do not have a primary care provider, please call [phone] and they will assist you.        Care EveryWhere ID     This is your Care EveryWhere ID. This could be used by other organizations to access your [place] medical records  [id number]        Your Vitals Were     Pulse Temperature Height Pulse Oximetry BMI (Body Mass Index)       83 97.4  F (36.3  C) (Oral) 1.759 m (5' 9.25\") 98% 36.3 kg/m2        Blood Pressure from Last 3 Encounters:   03/02/18 126/84   01/11/18 126/82   11/20/17 138/88    Weight " from Last 3 Encounters:   03/02/18 112.3 kg (247 lb 9.6 oz)   01/11/18 116.8 kg (257 lb 6.4 oz)   01/09/17 115.2 kg (254 lb)              Today, you had the following     No orders found for display       Primary Care Provider Office Phone # Fax #    Paul Styles [phone] [address] [facility] [address]  [facility] [address]        Equal Access to Services     [facility] : Hadii aad ku hadasho Soomaali, waaxda luqadaha, qaybta kaalmada adeegyada, waxay idiin hayaan adeeg kharash ladean . [facility] [phone].    ATENCIÓN: Si habla español, tiene a davis disposición servicios gratuitos de asistencia lingüística. [facility] [phone].    We comply with applicable federal civil rights laws and [place] laws. We do not discriminate on the basis of race, color, national origin, age, disability, sex, sexual orientation, or gender identity.            Thank you!     Thank you for choosing [facility]  for your care. Our goal is always to provide you with excellent care. Hearing back from our patients is one way we can continue to improve our services. Please take a few minutes to complete the written survey that you may receive in the mail after your visit with us. Thank you!             Your Updated Medication List - Protect others around you: Learn how to safely use, store and throw away your medicines at www.disposemymeds.org.          This list is accurate as of 3/2/18  1:59 PM.  Always use your most recent med list.                   Brand Name Dispense Instructions for use Diagnosis    cyclobenzaprine 5 MG tablet    FLEXERIL    30 tablet    Take 1 tablet (5 mg) by mouth 3 times daily as needed for muscle spasms (Take 1 tablet by mouth as need for muscle pain)    Back muscle spasm       fluticasone 110 MCG/ACT Inhaler    FLOVENT HFA    1 Inhaler    Inhale 2 puffs into the lungs 2 times daily    Mild persistent asthma without complication       ibuprofen 800 MG  tablet    ADVIL/MOTRIN    30 tablet    Take 1 tablet by mouth every 8 hours as needed for pain.    Sprain of lumbar region       Multi-vitamin Tabs tablet      Take 1 tablet by mouth daily        VENTOLIN  (90 BASE) MCG/ACT Inhaler   Generic drug:  albuterol     1 Inhaler    INHALE 2 PUFFS INTO THE LUNGS EVERY 6 HOURS AS NEEDED FOR SHORTNESS OF BREATH OR DIFFICULT BREATHING    Mild persistent asthma without complication

## 2018-04-23 ENCOUNTER — OFFICE VISIT (OUTPATIENT)
Dept: FAMILY MEDICINE | Facility: CLINIC | Age: 63
End: 2018-04-23

## 2018-04-23 VITALS
BODY MASS INDEX: 34.82 KG/M2 | SYSTOLIC BLOOD PRESSURE: 134 MMHG | HEIGHT: 70 IN | TEMPERATURE: 98.7 F | OXYGEN SATURATION: 94 % | RESPIRATION RATE: 16 BRPM | HEART RATE: 89 BPM | WEIGHT: 243.2 LBS | DIASTOLIC BLOOD PRESSURE: 80 MMHG

## 2018-04-23 DIAGNOSIS — R07.0 THROAT PAIN: Primary | ICD-10-CM

## 2018-04-23 DIAGNOSIS — R07.89 CHEST WALL PAIN: ICD-10-CM

## 2018-04-23 DIAGNOSIS — J06.9 VIRAL UPPER RESPIRATORY TRACT INFECTION: ICD-10-CM

## 2018-04-23 LAB — S PYO AG THROAT QL IA.RAPID: NORMAL

## 2018-04-23 PROCEDURE — 87880 STREP A ASSAY W/OPTIC: CPT | Performed by: FAMILY MEDICINE

## 2018-04-23 PROCEDURE — 99213 OFFICE O/P EST LOW 20 MIN: CPT | Performed by: FAMILY MEDICINE

## 2018-04-23 PROCEDURE — 87070 CULTURE OTHR SPECIMN AEROBIC: CPT | Performed by: FAMILY MEDICINE

## 2018-04-23 RX ORDER — GUAIFENESIN 600 MG/1
1200 TABLET, EXTENDED RELEASE ORAL 2 TIMES DAILY PRN
Qty: 40 TABLET | Refills: 0 | Status: SHIPPED | OUTPATIENT
Start: 2018-04-23 | End: 2018-09-06

## 2018-04-23 NOTE — PATIENT INSTRUCTIONS
Throat pain  (primary encounter diagnosis)  Plan: RAPID STREP (BFP), THROAT CULTURE (BFP)        Await culture    (J06.9,  B97.89) Viral upper respiratory tract infection  Plan: guaiFENesin (MUCINEX) 600 MG 12 hr tablet        Symptomatic care with decongestants, fluids, tylenol/advil prn. Use GUAIFENESIN  MG OR TBCR, 1 tab po BID (Twice per day), D: 20, R: 0 for congestion and cough.    In addition, I have suggested that the patient   monitor for symptoms of bacterial infection expecting slow gradual resolution of viral URI as the natural course.

## 2018-04-23 NOTE — MR AVS SNAPSHOT
After Visit Summary   4/23/2018    Sarthak Berrios    MRN: [id number]           Patient Information     Date Of Birth          1955        Visit Information        Provider Department      4/23/2018 11:15 AM Ro Hernandes MD [facility]        Today's Diagnoses     Throat pain    -  1    Viral upper respiratory tract infection        Chest wall pain          Care Instructions     Throat pain  (primary encounter diagnosis)  Plan: RAPID STREP (BFP), THROAT CULTURE (BFP)        Await culture    (J06.9,  B97.89) Viral upper respiratory tract infection  Plan: guaiFENesin (MUCINEX) 600 MG 12 hr tablet        Symptomatic care with decongestants, fluids, tylenol/advil prn. Use GUAIFENESIN  MG OR TBCR, 1 tab po BID (Twice per day), D: 20, R: 0 for congestion and cough.    In addition, I have suggested that the patient   monitor for symptoms of bacterial infection expecting slow gradual resolution of viral URI as the natural course.            Follow-ups after your visit        Follow-up notes from your care team     Return if symptoms worsen or fail to improve.      Who to contact     If you have questions or need follow up information about today's clinic visit or your schedule please contact [facility] directly at [phone].  Normal or non-critical lab and imaging results will be communicated to you by [employer], letter or phone within 4 business days after the clinic has received the results. If you do not hear from us within 7 days, please contact the clinic through [employer] or phone. If you have a critical or abnormal lab result, we will notify you by phone as soon as possible.  Submit refill requests through [employer] or call your pharmacy and they will forward the refill request to us. Please allow 3 business days for your refill to be completed.          Additional Information About Your Visit        Gura [employer]ular[employer] Information     [employer] gives you secure  "access to your electronic health record. If you see a primary care provider, you can also send messages to your care team and make appointments. If you have questions, please call your primary care clinic.  If you do not have a primary care provider, please call [phone] and they will assist you.        Care EveryWhere ID     This is your Care EveryWhere ID. This could be used by other organizations to access your [facility] medical records  [id number]        Your Vitals Were     Pulse Temperature Respirations Height Pulse Oximetry BMI (Body Mass Index)    89 98.7  F (37.1  C) (Oral) 16 1.765 m (5' 9.5\") 94% 35.4 kg/m2       Blood Pressure from Last 3 Encounters:   04/23/18 134/80   03/02/18 126/84   01/11/18 126/82    Weight from Last 3 Encounters:   04/23/18 110.3 kg (243 lb 3.2 oz)   03/02/18 112.3 kg (247 lb 9.6 oz)   01/11/18 116.8 kg (257 lb 6.4 oz)              We Performed the Following     RAPID STREP (BFP)     THROAT CULTURE (BFP)          Today's Medication Changes          These changes are accurate as of 4/23/18 12:07 PM.  If you have any questions, ask your nurse or doctor.               Start taking these medicines.        Dose/Directions    guaiFENesin 600 MG 12 hr tablet   Commonly known as:  MUCINEX   Used for:  Viral upper respiratory tract infection   Started by:  Ro Hernandes MD        Dose:  1200 mg   Take 2 tablets (1,200 mg) by mouth 2 times daily as needed for congestion   Quantity:  40 tablet   Refills:  0            Where to get your medicines      These medications were sent to [id number] - [place], [place] - [address] AT [facility] &  [address] [address]     Phone:  [phone]     guaiFENesin 600 MG 12 hr tablet                Primary Care Provider Office Phone # Fax #    Paul Styles [phone] [address] [facility] [address]  [facility] [address]        Equal Access to Services     SUSY DAO AH: " Hadii aad ku hadbernardo Somckaylaali, waaxda luqadaha, qaybta kaalcristopher maradiaga, hugo waltermichele mendez. So [facility] [phone].    ATENCIÓN: Si clarala job, tiene a davis disposición servicios gratuitos de asistencia lingüística. Llame al [phone].    We comply with applicable federal civil rights laws and [place] laws. We do not discriminate on the basis of race, color, national origin, age, disability, sex, sexual orientation, or gender identity.            Thank you!     Thank you for choosing [facility]  for your care. Our goal is always to provide you with excellent care. Hearing back from our patients is one way we can continue to improve our services. Please take a few minutes to complete the written survey that you may receive in the mail after your visit with us. Thank you!             Your Updated Medication List - Protect others around you: Learn how to safely use, store and throw away your medicines at www.disposemymeds.org.          This list is accurate as of 4/23/18 12:07 PM.  Always use your most recent med list.                   Brand Name Dispense Instructions for use Diagnosis    guaiFENesin 600 MG 12 hr tablet    MUCINEX    40 tablet    Take 2 tablets (1,200 mg) by mouth 2 times daily as needed for congestion    Viral upper respiratory tract infection       ibuprofen 800 MG tablet    ADVIL/MOTRIN    30 tablet    Take 1 tablet by mouth every 8 hours as needed for pain.    Sprain of lumbar region       Multi-vitamin Tabs tablet      Take 1 tablet by mouth daily        VENTOLIN  (90 Base) MCG/ACT Inhaler   Generic drug:  albuterol     1 Inhaler    INHALE 2 PUFFS INTO THE LUNGS EVERY 6 HOURS AS NEEDED FOR SHORTNESS OF BREATH OR DIFFICULT BREATHING    Mild persistent asthma without complication

## 2018-04-23 NOTE — PROGRESS NOTES
SUBJECTIVE: 62 year old male complaining of sore throat followed by nasal congestion for 2 week(s).   The patient describes clear drainage and chills. Has a spot on his chest that aches with coughing  The patient denies a history of SOB, GI complaints or rash.   Smoking history: No.   Relevant past medical history: positive for asthma in the past on Flovent usually spring/allergies/ stopped and doing well the last 6 months. Has used Ventolin inhaler at bedtime sometimes before bedtime    OBJECTIVE: The patient appears healthy, alert, no distress, cooperative, smiling, over weight and fatigued.   EARS: negative  NOSE/SINUS: positive findings: mucosa erythematous and swollen, clear rhinorrhea   THROAT: normal, no tonsillar hypertrophy, no exudates present, throat culture taken, rapid strep done and post nasal drainage   NECK:Neck supple. No adenopathy. Thyroid symmetric, normal size,, Carotids without bruits.   CHEST: Regular rate and  rhythm. S1 and S2 normal, no murmurs, clicks, gallops or rubs. No edema or JVD. Chest is clear; no wheezes or rales.  Left upper chest wall intercostal rib pain in a 2 cm spot/ tender with palpation. No deformity noted    ASSESSMENT: (R07.0) Throat pain  (primary encounter diagnosis)  Plan: RAPID STREP (BFP), THROAT CULTURE (BFP)        Await culture    (J06.9,  B97.89) Viral upper respiratory tract infection  Plan: guaiFENesin (MUCINEX) 600 MG 12 hr tablet        Symptomatic care with decongestants, fluids, tylenol/advil prn. Use GUAIFENESIN  MG OR TBCR, 1 tab po BID (Twice per day), D: 20, R: 0 for congestion and cough.    In addition, I have suggested that the patient   monitor for symptoms of bacterial infection expecting slow gradual resolution of viral URI as the natural course.      (R07.89) Chest wall pain  Comment: anatomy reviewed  Plan: Monitor for resolution.

## 2018-04-23 NOTE — NURSING NOTE
Sarthak Berrios is here today for throat pain, congestion, and mild headache for the past couple of weeks    Pre-visit Screening:    Immunizations:  up to date  Colonoscopy:  is up to date  Asthma Action Test/Plan:  is up to date  PHQ9:  NA  GAD7:  NA    Questioned patient about current smoking habits Pt. has never smoked.    Is it ok to leave a detailed message on cell phone's voice mail for today's visit only? Yes     Phone # [phone] (mobile)      Naomy Seo CMA

## 2018-04-25 LAB — THROAT CULTURE: NORMAL

## 2018-09-06 ENCOUNTER — OFFICE VISIT (OUTPATIENT)
Dept: FAMILY MEDICINE | Facility: CLINIC | Age: 63
End: 2018-09-06

## 2018-09-06 VITALS
TEMPERATURE: 98.2 F | HEART RATE: 84 BPM | SYSTOLIC BLOOD PRESSURE: 130 MMHG | BODY MASS INDEX: 34.21 KG/M2 | OXYGEN SATURATION: 94 % | WEIGHT: 235 LBS | DIASTOLIC BLOOD PRESSURE: 84 MMHG

## 2018-09-06 DIAGNOSIS — L60.0 INGROWN TOENAIL WITHOUT INFECTION: ICD-10-CM

## 2018-09-06 DIAGNOSIS — G44.209 TENSION HEADACHE: Primary | ICD-10-CM

## 2018-09-06 DIAGNOSIS — M79.674 GREAT TOE PAIN, RIGHT: ICD-10-CM

## 2018-09-06 PROCEDURE — 99213 OFFICE O/P EST LOW 20 MIN: CPT | Performed by: PHYSICIAN ASSISTANT

## 2018-09-06 NOTE — PROGRESS NOTES
CC: Headache, R big toe pain    History:  Last night, Sarthak had a headache. Started at 11 PM. Headache is in back of his head. He felt warm at that time, but no fever. Took ibuprofen, and headache improved. This morning was sleeping, and feeling fatigue. Headache has been better, and but has not completely resolved. Was supposed to work this AM at 9, and was feeling too tire, but since he has vacation scheduled, his work is needing a note.     Sarthak is also concerned about his R great toe. He had what he thought was toe fungus where he gets some pain surrounding toenail. Did try OTC nail fungus remover, and mouthwash. Occasionally will ache at IP joint as well but wondering if this could be separate or related.     PMH, MEDICATIONS, ALLERGIES, SOCIAL AND FAMILY HISTORY in EPIC and reviewed by me personally.      ROS negative other than the symptoms noted above in the HPI.        Examination   /84 (BP Location: Left arm, Patient Position: Sitting, Cuff Size: Adult Large)  Pulse 84  Temp 98.2  F (36.8  C) (Oral)  Wt 106.6 kg (235 lb)  SpO2 94%  BMI 34.21 kg/m2       Constitutional: Sitting comfortably, in no acute distress. Vital signs noted  Eyes: pupils equal round reactive to light and accomodation, extra ocular movements intact  Ears: external canals and TMs free of abnormalities  Nose: patent, without mucosal abnormalities  Mouth and throat: without erythema or lesions of the mucosa  Neck:  no adenopathy, trachea midline and normal to palpation  Cardiovascular:  regular rate and rhythm, no murmurs, clicks, or gallops  Respiratory:  normal respiratory rate and rhythm, lungs clear to auscultation  M/S: ROM of right great toe intact. Does have mild hallux valgus. No edema or erythema. Mild tenderness to palpation over dorsal IP joint.   NEURO:  cranial nerves 2-12 intact, muscle strength normal, sensation to light touch and pinprick normal, reflexes normal and symmetric  SKIN: No jaundice/pallor/rash. Right  Great toenail inserted into medial aspect of skin but no erythema, edema.   Psychiatric: mentation appears normal and affect normal/bright        A/P    ICD-10-CM    1. Tension headache G44.209    2. Ingrown toenail without infection L60.0    3. Great toe pain, right M79.674        DISCUSSION:  Consistent with tension headache. Reassured by normal neuro exam, and near resolution of symptoms. Wrote work note to return to work as scheduled. Contact me if headaches persist.    For toe, did slightly manipulate nail in office to prevent acute paronychia. Recommended weekly soaks with epsom salt, followed by careful trimming of nails. If IP joint pain continues would like Sarthak to follow up with podiatry as this is likely mild arthritis that may benefit from orthotics. Encouraged on weight loss efforts.      follow up visit: As needed    Flakita Rodriguez PA-C  [facility]

## 2018-09-06 NOTE — LETTER
[facility]  [facility] of [facility]  [facility] [address]  [place]  [address]    2018        Patient Name: Sarthak Berrios  : 1955              To Whom It May Concern:    Sarthak is a patient at our clinic. Due to illness, please excuse Sarthak from all work responsibilities 2018. He may return as scheduled without restriction 2018.     If you have any further questions or problems, please contact our office at [phone].          Flakita Rodriguez PA-C

## 2018-09-06 NOTE — NURSING NOTE
"Sarthak is here for headache, tired, needs a \"sick day\" note for today /// look at big toe on R foot, nail is painful        Pre-visit Screening:  Immunizations:  up to date  Colonoscopy:  is up to date  Mammogram: NA  Asthma Action Test/Plan:  Yes  PHQ9:  none  GAD7:  none  Questioned patient about current smoking habits Pt. has never smoked.  Ok to leave detailed message on voice mail for today's visit only Yes, phone # [phone]        "

## 2018-09-06 NOTE — MR AVS SNAPSHOT
After Visit Summary   9/6/2018    Sarthak Berrios    MRN: [id number]           Patient Information     Date Of Birth          1955        Visit Information        Provider Department      9/6/2018 11:30 AM Flakita Rodriguez PA-C [facility]        Today's Diagnoses     Tension headache    -  1    Ingrown toenail without infection        Great toe pain, right           Follow-ups after your visit        Follow-up notes from your care team     Return if symptoms worsen or fail to improve.      Who to contact     If you have questions or need follow up information about today's clinic visit or your schedule please contact [facility] directly at [phone].  Normal or non-critical lab and imaging results will be communicated to you by MyChart, letter or phone within 4 business days after the clinic has received the results. If you do not hear from us within 7 days, please contact the clinic through [employer] or phone. If you have a critical or abnormal lab result, we will notify you by phone as soon as possible.  Submit refill requests through [employer] or call your pharmacy and they will forward the refill request to us. Please allow 3 business days for your refill to be completed.          Additional Information About Your Visit        MyChart Information     [employer] gives you secure access to your electronic health record. If you see a primary care provider, you can also send messages to your care team and make appointments. If you have questions, please call your primary care clinic.  If you do not have a primary care provider, please call [phone] and they will assist you.        Care EveryWhere ID     This is your Care EveryWhere ID. This could be used by other organizations to access your [facility] medical records  [id number]        Your Vitals Were     Pulse Temperature Pulse Oximetry BMI (Body Mass Index)          84 98.2  F (36.8  C) (Oral)  94% 34.21 kg/m2         Blood Pressure from Last 3 Encounters:   09/06/18 130/84   04/23/18 134/80   03/02/18 126/84    Weight from Last 3 Encounters:   09/06/18 106.6 kg (235 lb)   04/23/18 110.3 kg (243 lb 3.2 oz)   03/02/18 112.3 kg (247 lb 9.6 oz)              Today, you had the following     No orders found for display       Primary Care Provider Office Phone # Fax #    Paul Styles [phone] [address] [facility] [address]  [facility] [address]        Equal Access to Services     [facility] CenterJANEEN : Hadii rose campbell hadbernardo Solinh, waaxda luqadaha, qaybta kaalmada adesarahyada, hugo wooten . So [facility] [phone].    ATENCIÓN: Si habla español, tiene a davis disposición servicios gratuitos de asistencia lingüística. LlRegency Hospital Toledo [phone].    We comply with applicable federal civil rights laws and [place] laws. We do not discriminate on the basis of race, color, national origin, age, disability, sex, sexual orientation, or gender identity.            Thank you!     Thank you for choosing [facility]  for your care. Our goal is always to provide you with excellent care. Hearing back from our patients is one way we can continue to improve our services. Please take a few minutes to complete the written survey that you may receive in the mail after your visit with us. Thank you!             Your Updated Medication List - Protect others around you: Learn how to safely use, store and throw away your medicines at www.disposemymeds.org.          This list is accurate as of 9/6/18  3:32 PM.  Always use your most recent med list.                   Brand Name Dispense Instructions for use Diagnosis    ibuprofen 800 MG tablet    ADVIL/MOTRIN    30 tablet    Take 1 tablet by mouth every 8 hours as needed for pain.    Sprain of lumbar region       Multi-vitamin Tabs tablet      Take 1 tablet by mouth daily        VENTOLIN  (90 Base) MCG/ACT  inhaler   Generic drug:  albuterol     1 Inhaler    INHALE 2 PUFFS INTO THE LUNGS EVERY 6 HOURS AS NEEDED FOR SHORTNESS OF BREATH OR DIFFICULT BREATHING    Mild persistent asthma without complication

## 2018-09-07 ASSESSMENT — ASTHMA QUESTIONNAIRES: ACT_TOTALSCORE: 25

## 2019-01-16 ENCOUNTER — HOSPITAL ENCOUNTER (OUTPATIENT)
Facility: CLINIC | Age: 64
Discharge: HOME | End: 2019-01-16
Attending: INTERNAL MEDICINE | Admitting: INTERNAL MEDICINE
Payer: COMMERCIAL

## 2019-01-16 VITALS
RESPIRATION RATE: 16 BRPM | OXYGEN SATURATION: 96 % | HEIGHT: 70 IN | BODY MASS INDEX: 33.64 KG/M2 | SYSTOLIC BLOOD PRESSURE: 126 MMHG | HEART RATE: 78 BPM | WEIGHT: 235 LBS | DIASTOLIC BLOOD PRESSURE: 87 MMHG

## 2019-01-16 LAB — COLONOSCOPY: NORMAL

## 2019-01-16 PROCEDURE — G0121 COLON CA SCRN NOT HI RSK IND: HCPCS | Performed by: INTERNAL MEDICINE

## 2019-01-16 PROCEDURE — G0500 MOD SEDAT ENDO SERVICE >5YRS: HCPCS | Performed by: INTERNAL MEDICINE

## 2019-01-16 PROCEDURE — 25000128 H RX IP 250 OP 636: Performed by: INTERNAL MEDICINE

## 2019-01-16 PROCEDURE — 45378 DIAGNOSTIC COLONOSCOPY: CPT | Performed by: INTERNAL MEDICINE

## 2019-01-16 RX ORDER — FLUMAZENIL 0.1 MG/ML
0.2 INJECTION, SOLUTION INTRAVENOUS
Status: DISCONTINUED | OUTPATIENT
Start: 2019-01-16 | End: 2019-01-16 | Stop reason: HOSPADM

## 2019-01-16 RX ORDER — LIDOCAINE 40 MG/G
CREAM TOPICAL
Status: DISCONTINUED | OUTPATIENT
Start: 2019-01-16 | End: 2019-01-16 | Stop reason: HOSPADM

## 2019-01-16 RX ORDER — NALOXONE HYDROCHLORIDE 0.4 MG/ML
.1-.4 INJECTION, SOLUTION INTRAMUSCULAR; INTRAVENOUS; SUBCUTANEOUS
Status: DISCONTINUED | OUTPATIENT
Start: 2019-01-16 | End: 2019-01-16 | Stop reason: HOSPADM

## 2019-01-16 RX ORDER — FENTANYL CITRATE 50 UG/ML
INJECTION, SOLUTION INTRAMUSCULAR; INTRAVENOUS PRN
Status: DISCONTINUED | OUTPATIENT
Start: 2019-01-16 | End: 2019-01-16 | Stop reason: HOSPADM

## 2019-01-16 RX ORDER — ONDANSETRON 4 MG/1
4 TABLET, ORALLY DISINTEGRATING ORAL EVERY 6 HOURS PRN
Status: DISCONTINUED | OUTPATIENT
Start: 2019-01-16 | End: 2019-01-16 | Stop reason: HOSPADM

## 2019-01-16 RX ORDER — ONDANSETRON 2 MG/ML
4 INJECTION INTRAMUSCULAR; INTRAVENOUS
Status: DISCONTINUED | OUTPATIENT
Start: 2019-01-16 | End: 2019-01-16 | Stop reason: HOSPADM

## 2019-01-16 RX ORDER — ONDANSETRON 2 MG/ML
4 INJECTION INTRAMUSCULAR; INTRAVENOUS EVERY 6 HOURS PRN
Status: DISCONTINUED | OUTPATIENT
Start: 2019-01-16 | End: 2019-01-16 | Stop reason: HOSPADM

## 2019-01-16 ASSESSMENT — MIFFLIN-ST. JEOR: SCORE: 1872.2

## 2019-01-16 NOTE — DISCHARGE INSTRUCTIONS

## 2019-01-16 NOTE — LETTER
December 26, 2018      Sarthak Berrios  [address]  [place] [address]        Dear Sarthak,         Thank you for choosing [facility]. You are scheduled for the following service.     Date:  1-16-19             Procedure:  COLONOSCOPY  Doctor:        Alena   Arrival Time:  0730  *Check in at Emergency/Endoscopy desk*  Procedure Time:  0800    Location:   [facility]        Endoscopy Department, First Floor (Enter through ER Doors) *        [address] [place], [place] [address] [phone] or [phone] () to reschedule      MIRALAX -GATORADE  PREP  Colonoscopy is the most accurate test to detect colon polyps and colon cancer; and the only test where polyps can be removed. During this procedure, a doctor examines the lining of your large intestine and rectum through a flexible tube.           Transportation  Arrange for a ride for the day of your procedure with a responsible adult.  A taxi ride is not an option unless you are accompanied by a responsible adult. If you fail to arrange transportation with a responsible adult, your procedure will be cancelled and rescheduled.    Purchase the  following supplies at your local pharmacy:  - 2 (two) bisacodyl tablets: each tablet contains 5 mg.  (Dulcolax  laxative NOT Dulcolax  stool softener)   - 1 (one) 8.3 oz bottle of Polyethylene Glycol (PEG) 3350 Powder   (MiraLAX , Smooth LAX , ClearLAX  or equivalent)  - 64 oz Gatorade    Regular Gatorade, Gatorade G2 , Powerade , Powerade Zero  or Pedialyte  is acceptable. Red colored flavors are not allowed; all other colors (yellow, green, orange, purple and blue) are okay. It is also okay to buy two 2.12 oz packets of powdered Gatorade that can be mixed with water to a total volume of 64 oz of liquid.  - 1 (one) 10 oz bottle of Magnesium Citrate (Red colored flavors are not allowed)  It is also okay for you to use a 0.5 oz package of powdered  magnesium citrate (17 g) mixed with 10 oz of water.    PREPARATION FOR COLONOSCOPY    7 days before:    Discontinue fiber supplements and medications containing iron. This includes Metamucil  and Fibercon ; and multivitamins with iron.  3 days before:    Begin a low-fiber diet. A low-fiber diet helps making the cleanout more effective.     Examples of a low-fiber diet include (but are not limited to): white bread, white rice, pasta, crackers, fish, chicken, eggs, ground beef, creamy peanut butter, cooked/steamed/boiled vegetables, canned fruit, bananas, melons, milk, plain yogurt cheese, salad dressing and other condiments.     The following are not allowed on a low-fiber diet: seeds, nuts, popcorn, bran, whole wheat, corn, quinoa, raw fruits and vegetables, berries and dried fruit, beans and lentils.    For additional details on low-fiber diet, please refer to the table on the last page.  2 days before:    Continue the low-fiber diet.     Drink at least 8 glasses of water throughout the day.     Stop eating solid foods at 11:45 pm.  1 day before:    In the morning: begin a clear liquid diet (liquids you can see through).     Examples of a clear liquid diet include: water, clear broth or bouillon, Gatorade, Pedialyte or Powerade, carbonated and non-carbonated soft drinks (Sprite , 7-Up , ginger ale), strained fruit juices without pulp (apple, white grape, white cranberry), Jell-O  and popsicles.     The following are not allowed on a clear liquid diet: red liquids, alcoholic beverages, dairy products (milk, creamer, and yogurt), protein shakes, creamy broths, juice with pulp and chewing tobacco.    At noon: take 2 (two) bisacodyl tablets     At 4 (and no later than 6pm): start drinking the Miralax-Gatorade preparation (8.3 oz of Miralax mixed with 64 oz of Gatorade in a large pitcher). Drink 1(one) 8 oz glass every 15 minutes thereafter, until the mixture is gone.    COLON CLEANSING TIPS: drink adequate amounts of  fluids before and after your colon cleansing to prevent dehydration. Stay near a toilet because you will have diarrhea. Even if you are sitting on the toilet, continue to drink the cleansing solution every 15 minutes. If you feel nauseous or vomit, rinse your mouth with water, take a 15 to 30-minute-break and then continue drinking the solution. You will be uncomfortable until the stool has flushed from your colon (in about 2 to 4 hours). You may feel chilled.              Day of your procedure  You may take all of your morning medications including blood pressure medications, blood thinners (if you have not been instructed to stop these by our office), methadone, anti-seizure medications with sips of water 3 hours prior to your procedure or earlier. Do not take insulin or vitamins prior to your procedure. Continue the clear liquid diet.   4 hours prior: drink 10 oz of magnesium citrate. It may be easier to drink it with a straw.    STOP consuming all liquids after that.     Do not take anything by mouth during this time.     Allow extra time to travel to your procedure as you may need to stop and use a restroom along the way.  You are ready for the procedure, if you followed all instructions and your stool is no longer formed, but clear or yellow liquid. If you are unsure whether your colon is clean, please call our office at [phone] before you leave for your appointment.  Bring the following to your procedure:  - Insurance Card/Photo ID.   - List of current medications including over-the-counter medications and supplements.   - Your rescue inhaler if you currently use one to control asthma.      Canceling or rescheduling your appointment:   If you must cancel or reschedule your appointment, please call [phone] as soon as possible.      COLONOSCOPY PRE-PROCEDURE CHECKLIST  If you have diabetes, ask your regular doctor for diet and medication restrictions.  If you take an anticoagulant or anti-platelet  medication (such as Coumadin , Lovenox , Pradaxa , Xarelto , Eliquis , etc.), please call your primary doctor for advice on holding this medication.  If you take aspirin you may continue to do so.  If you are or may be pregnant, please discuss the risks and benefits of this procedure with your doctor.          What happens during a colonoscopy?    Plan to spend up to two hours, starting at registration time, at the endoscopy center the day of your procedure. The colonoscopy takes an average of 15 to 30 minutes. Recovery time is about 30 minutes.    Before the exam:    You will change into a gown.    Your medical history and medication list will be reviewed with you, unless that has been done over the phone prior to the procedure.     A nurse will insert an intravenous (IV) line into your hand or arm.    The doctor will meet with you and will give you a consent form to sign.    During the exam:     Medicine will be given through the IV line to help you relax.     Your heart rate and oxygen levels will be monitored. If your blood pressure is low, you may be given fluids through the IV line.     The doctor will insert a flexible hollow tube, called a colonoscope, into your rectum. The scope will be advanced slowly through the large intestine (colon).    You may have a feeling of fullness or pressure.     If an abnormal tissue or a polyp is found, the doctor may remove it through the endoscope for closer examination, or biopsy. Tissue removal is painless    After the exam:           Any tissue samples removed during the exam will be sent to a lab for evaluation. It may take 5-7 working days for you to be notified of the results.     A nurse will provide you with complete discharge instructions before you leave the endoscopy center. Be sure to ask the nurse for specific instructions if you take blood thinners such as Aspirin, Coumadin or Plavix.     The doctor will prepare a full report for you and for the physician who  referred you for the procedure.     Your doctor will talk with you about the initial results of your exam.      Medication given during the exam will prohibit you from driving for the rest of the day.     Following the exam, you may resume your normal diet. Your first meal should be light, no greasy foods. Avoid alcohol until the next day.     You may resume your regular activities the day after the procedure.     LOW-FIBER DIET    Foods RECOMMENDED Foods to AVOID   Breads, Cereal, Rice and Pasta:   White bread, rolls, biscuits, croissant and dharmesh toast.   Waffles, [place] Islander toast and pancakes.   White rice, noodles, pasta, macaroni and peeled cooked potatoes.   Plain crackers and saltines.   Cooked cereals: farina, cream of rice.   Cold cereals: Puffed Rice , Rice Krispies , Corn Flakes  and Special K    Breads, Cereal, Rice and Pasta:   Breads or rolls with nuts, seeds or fruit.   Whole wheat, pumpernickel, rye breads and cornbread.   Potatoes with skin, brown or wild rice, and kasha (buckwheat).     Vegetables:   Tender cooked and canned vegetables without seeds: carrots, asparagus tips, green or wax beans, pumpkin, spinach, lima beans. Vegetables:   Raw or steamed vegetables.   Vegetables with seeds.   Sauerkraut.   Winter squash, peas, broccoli, Brussel sprouts, cabbage, onions, cauliflower, baked beans, peas and corn.   Fruits:   Strained fruit juice.   Canned fruit, except pineapple.   Ripe bananas and melon. Fruits:   Prunes and prune juice.   Raw fruits.   Dried fruits: figs, dates and raisins.   Milk/Dairy:   Milk: plain or flavored.   Yogurt, custard and ice cream.   Cheese and cottage cheese Milk/Dairy:     Meat and other proteins:   ground, well-cooked tender beef, lamb, ham, veal, pork, fish, poultry and organ meats.   Eggs.   Peanut butter without nuts. Meat and other proteins:   Tough, fibrous meats with gristle.   Dry beans, peas and lentils.   Peanut butter with nuts.   Tofu.   Fats, Snack, Sweets,  Condiments and Beverages:   Margarine, butter, oils, mayonnaise, sour cream and salad dressing, plain gravy.   Sugar, hard candy, clear jelly, honey and syrup.   Spices, cooked herbs, bouillon, broth and soups made with allowed vegetable, ketchup and mustard.   Coffee, tea and carbonated drinks.   Plain cakes, cookies and pretzels.   Gelatin, plain puddings, custard, ice cream, sherbet and popsicles. Fats, Snack, Sweets, Condiments and Beverages:   Nuts, seeds and coconut.   Jam, marmalade and preserves.   Pickles, olives, relish and horseradish.   All desserts containing nuts, seeds, dried fruit and coconut; or made from whole grains or bran.   Candy made with nuts or seeds.   Popcorn.                     DIRECTIONS TO THE ENDOSCOPY DEPARTMENT     From the north ([facility])  Take [address] South, exit on [address] [address]. Get into the left hand anabelle, turn left (east), go one-half mile to [address] and turn left. Go north to the first stoplight, take a right on [address] and follow it to the Emergency entrance.    From the south ([facility])  Take 35N to the 35E split and exit on [address] [address]. On [address] [address], turn left (west) to [address]. Turn right (north) on [address]. Go north to the first stoplight, take a right on [address] and follow it to the Emergency entrance.    From the east via 35E ([facility])  Take [address] south to [address] [address] exit. Turn right on [address] [address]. Go west to [address]. Turn right (north) on [address]. Go to the first stoplight, take a right and follow on [address] to the Emergency entrance.    From the east via [address] ([facility])  Take [address] to [address]. Turn left (south) on [address] to [address]. Turn left (east) on [address] and follow it to the Emergency entrance.    From the west via [address] ([place], [place])  Take [address] east to [address].  Turn right (south) on [address] to [place]. Turn left (east) on [address] and follow it to the Emergency entrance.

## 2019-04-26 ENCOUNTER — OFFICE VISIT (OUTPATIENT)
Dept: FAMILY MEDICINE | Facility: CLINIC | Age: 64
End: 2019-04-26

## 2019-04-26 VITALS
HEART RATE: 102 BPM | TEMPERATURE: 98.2 F | BODY MASS INDEX: 35.15 KG/M2 | OXYGEN SATURATION: 94 % | DIASTOLIC BLOOD PRESSURE: 78 MMHG | WEIGHT: 245 LBS | SYSTOLIC BLOOD PRESSURE: 132 MMHG

## 2019-04-26 DIAGNOSIS — J06.9 ACUTE URI: Primary | ICD-10-CM

## 2019-04-26 DIAGNOSIS — M79.671 RIGHT FOOT PAIN: ICD-10-CM

## 2019-04-26 DIAGNOSIS — J45.20 MILD INTERMITTENT ASTHMA WITHOUT COMPLICATION: ICD-10-CM

## 2019-04-26 PROCEDURE — 99213 OFFICE O/P EST LOW 20 MIN: CPT | Performed by: PHYSICIAN ASSISTANT

## 2019-04-26 NOTE — PROGRESS NOTES
SUBJECTIVE:                                                    Sarthak Berrios is a 63 year old male who presents to clinic today for the following health issues:    Sarthak  is here today because of: Sinus Pain    Onset of symptoms was 4 days ago.    The patient has had symptoms of:  Nasal congestion    No hx of seasonal allergies    Wifes grandkids were with family last week and was ill    Patient denies fever    Course of illness is same.    Patient admits to exposure to illness at home or work/school.     Treatment measures tried include:   Walfed - didn't really help    Patient is not a smoker    Patient does have a history of asthma      ROS:  CV: NEGATIVE for chest pain or palpitations   GI: NEGATIVE for nausea, vomiting, abdominal pain, diarrhea or constipation  : negative for dysuria, hematuria      Right toe pain for years  Worsening in the last year  Dr. Cortes told him years ago he would probably develop arthritis in his foot.       BP Readings from Last 3 Encounters:   04/26/19 132/78   01/16/19 126/87   09/06/18 130/84    Wt Readings from Last 3 Encounters:   04/26/19 111.1 kg (245 lb)   01/16/19 106.6 kg (235 lb)   09/06/18 106.6 kg (235 lb)                  Patient Active Problem List   Diagnosis     Family history of malignant neoplasm of prostate     Osteoarthritis     FAMILY HX GI MALIGNANCY     ACP (advance care planning)     Health Care Home     Degenerative arthritis of knee     History of total bilateral knee replacement     Mild intermittent asthma without complication     Past Surgical History:   Procedure Laterality Date     ARTHROPLASTY KNEE Left 11/17/2015    Procedure: ARTHROPLASTY KNEE;  Surgeon: Alfie Ingram MD;  Location: [facility] OR     AS TOTAL KNEE ARTHROPLASTY Right      COLONOSCOPY N/A 1/16/2019    Procedure: COLONOSCOPY;  Surgeon: Paul Carvajal MD;  Location:  GI     HC COLONOSCOPY THRU STOMA, DIAGNOSTIC  2009     HC KNEE SCOPE, DIAGNOSTIC  1999    Arthroscopy, Knee   Right   Dr. Ingram     HC REPAIR UMBILICAL LILLIAM,5+Y/O,REDUC  1996     LIGATN/STRIP LONG OR SHORT SAPHEN  2000       Social History     Tobacco Use     Smoking status: Never Smoker     Smokeless tobacco: Never Used   Substance Use Topics     Alcohol use: Yes     Alcohol/week: 1.5 oz     Types: 3 Standard drinks or equivalent per week     Comment: twice a week     Family History   Problem Relation Age of Onset     Alcohol/Drug Father      Cancer Maternal Grandfather         Prostate     Cancer Brother         Bladder     Cardiovascular No family hx of      Diabetes No family hx of      Cancer - colorectal No family hx of      Breast Cancer No family hx of          Current Outpatient Medications   Medication Sig Dispense Refill     ibuprofen (ADVIL,MOTRIN) 800 MG tablet Take 1 tablet by mouth every 8 hours as needed for pain. 30 tablet 1     multivitamin, therapeutic with minerals (MULTI-VITAMIN) TABS Take 1 tablet by mouth daily       VENTOLIN  (90 BASE) MCG/ACT Inhaler INHALE 2 PUFFS INTO THE LUNGS EVERY 6 HOURS AS NEEDED FOR SHORTNESS OF BREATH OR DIFFICULT BREATHING 1 Inhaler 0     Allergies   Allergen Reactions     No Known Allergies        OBJECTIVE:                                                    /78 (BP Location: Left arm, Patient Position: Sitting, Cuff Size: Adult Large)   Pulse 102   Temp 98.2  F (36.8  C) (Oral)   Wt 111.1 kg (245 lb)   SpO2 94%   BMI 35.15 kg/m     Body mass index is 35.15 kg/m .     GENERAL: healthy, alert and no distress  EYES:Lids and Conjunctival normal, no discharge present bilaterally  EARS:   Right: CANAL and TM is normal: no effusions, no erythema, and normal landmarks  Left: CANAL and TM is normal: no effusions, no erythema, and normal landmarks  NOSE: normal  OROPHARYNX: normal, no tonsillar hypertrophy and no exudates present  NECK: normal, supple and no adenopathy  HEART: regular rate and rhythm; no murmurs, clicks, or gallops  LUNGS: CTA bilaterally  SKIN:Warm,  Dry and No Rash    Right foot  Inspection:  no swelling and no ecchymosis  Tender::1st MTP joint  Non-tender:proximal 5th metatarsal, midshaft 5th metatarsal, calcaneous , cuboid, navicular, cuneiform lateral, cuneiform middle, cuneiform medial   Range of Motion:flexion of toes:  full, extension of toes  full             ASSESSMENT/PLAN:                                                          P:     (J06.9) Acute URI  (primary encounter diagnosis)  Comment: new  Plan: most likely viral    Symptomatic cares advised including Increase fluids, rest, acetominophen or ibuprofen prn if not contraindicated.     If applicable advised the following:  Sore throat:  sugar free throat lozenges, sprays, Chloraseptic lozenges, salt water gargles  Cough: sugar free cough drops, honey, Delsym bid.    Sinus: Warm packs on face, Vicks Vapo Rub, Sudafed (if HTN well controlled)      Call for sinus abx if sx not improving end of next week      (M79.040) Right foot pain  Comment: new  Plan: X-ray rt Foot G/E 3 vws*  Xray machine down - will call pt when xray read    (J45.20) Mild intermittent asthma without complication          Claudia Vaz PA-C  4/26/2019

## 2019-04-26 NOTE — NURSING NOTE
Sarthak is here today for a sinus infection.    Pre-visit Screening:  Immunizations:  up to date  Colonoscopy:  is up to date  Mammogram: NA  Asthma Action Test/Plan:  HERNANDO  PHQ9:  PHQ 2 done today  GAD7:  NA  Questioned patient about current smoking habits Pt. has never smoked.  Ok to leave detailed message on voice mail for today's visit only Yes, phone # [phone]

## 2019-04-26 NOTE — Clinical Note
Bhavana - can you call pt to see if he got xray?Sanjuana - pt went to [facility] so please make sure we don't charge him for the xray.

## 2019-04-29 ENCOUNTER — TELEPHONE (OUTPATIENT)
Dept: FAMILY MEDICINE | Facility: CLINIC | Age: 64
End: 2019-04-29

## 2019-04-29 DIAGNOSIS — M19.071 PRIMARY OSTEOARTHRITIS OF RIGHT FOOT: Primary | ICD-10-CM

## 2019-04-29 NOTE — TELEPHONE ENCOUNTER
Advised [facility] eval with severe OA of MTP foot    LM for pt to schedule with [facility]    Claudia Vaz PA-C  4/29/2019

## 2019-05-13 ENCOUNTER — TRANSFERRED RECORDS (OUTPATIENT)
Dept: FAMILY MEDICINE | Facility: CLINIC | Age: 64
End: 2019-05-13

## 2019-06-26 ENCOUNTER — TRANSFERRED RECORDS (OUTPATIENT)
Dept: FAMILY MEDICINE | Facility: CLINIC | Age: 64
End: 2019-06-26

## 2019-09-20 ENCOUNTER — ALLIED HEALTH/NURSE VISIT (OUTPATIENT)
Dept: FAMILY MEDICINE | Facility: CLINIC | Age: 64
End: 2019-09-20

## 2019-09-20 DIAGNOSIS — Z23 NEED FOR VACCINATION: Primary | ICD-10-CM

## 2019-09-20 PROCEDURE — 90686 IIV4 VACC NO PRSV 0.5 ML IM: CPT | Performed by: FAMILY MEDICINE

## 2019-09-20 PROCEDURE — 90471 IMMUNIZATION ADMIN: CPT | Performed by: FAMILY MEDICINE

## 2019-10-02 ENCOUNTER — HEALTH MAINTENANCE LETTER (OUTPATIENT)
Age: 64
End: 2019-10-02

## 2019-11-06 ENCOUNTER — OFFICE VISIT (OUTPATIENT)
Dept: FAMILY MEDICINE | Facility: CLINIC | Age: 64
End: 2019-11-06

## 2019-11-06 VITALS
BODY MASS INDEX: 34.79 KG/M2 | HEART RATE: 79 BPM | SYSTOLIC BLOOD PRESSURE: 128 MMHG | DIASTOLIC BLOOD PRESSURE: 82 MMHG | WEIGHT: 243 LBS | OXYGEN SATURATION: 99 % | TEMPERATURE: 98.5 F | HEIGHT: 70 IN

## 2019-11-06 DIAGNOSIS — Z01.818 PREOP GENERAL PHYSICAL EXAM: Primary | ICD-10-CM

## 2019-11-06 DIAGNOSIS — M20.21 HALLUX RIGIDUS OF RIGHT FOOT: ICD-10-CM

## 2019-11-06 LAB — HEMOGLOBIN: 16.1 G/DL (ref 13.3–17.7)

## 2019-11-06 PROCEDURE — 93000 ELECTROCARDIOGRAM COMPLETE: CPT | Performed by: FAMILY MEDICINE

## 2019-11-06 PROCEDURE — 85018 HEMOGLOBIN: CPT | Performed by: FAMILY MEDICINE

## 2019-11-06 PROCEDURE — 99214 OFFICE O/P EST MOD 30 MIN: CPT | Performed by: FAMILY MEDICINE

## 2019-11-06 PROCEDURE — 36415 COLL VENOUS BLD VENIPUNCTURE: CPT | Performed by: FAMILY MEDICINE

## 2019-11-06 RX ORDER — ACETAMINOPHEN 500 MG
500-1000 TABLET ORAL EVERY 6 HOURS PRN
COMMUNITY
End: 2020-06-03

## 2019-11-06 ASSESSMENT — MIFFLIN-ST. JEOR: SCORE: 1895.55

## 2019-11-06 NOTE — LETTER
[facility]  [address]  [address]  [facility] [address]  [phone]  Dept: [phone]    PRE-OP EVALUATION:  Today's date: 2019    Sarthak Berrios (: 1955) presents for pre-operative evaluation assessment as requested by Dr. Kapoor.  He requires evaluation and anesthesia risk assessment prior to undergoing surgery/procedure for treatment of right hallux fusion.    Proposed Surgery/ Procedure: right hallux fusion  Date of Surgery/ Procedure: 19  Time of Surgery/ Procedure:   Hospital/Surgical Facility: [facility]  Fax number for surgical facility:   Primary Physician: Paul Styles  Type of Anesthesia Anticipated: to be determined    Patient has a Health Care Directive or Living Will:  YES     1. NO - Do you have a history of heart attack, stroke, stent, bypass or surgery on an artery in the head, neck, heart or legs?  2. NO - Do you ever have any pain or discomfort in your chest?  3. NO - Do you have a history of  Heart Failure?  4. NO - Are you troubled by shortness of breath when: walking on the level, up a slight hill or at night?  5. NO - Do you currently have a cold, bronchitis or other respiratory infection?  6. NO - Do you have a cough, shortness of breath or wheezing?  7. NO - Do you sometimes get pains in the calves of your legs when you walk?  8. NO - Do you or anyone in your family have previous history of blood clots?  9. NO - Do you or does anyone in your family have a serious bleeding problem such as prolonged bleeding following surgeries or cuts?  10. NO - Have you ever had problems with anemia or been told to take iron pills?  11. NO - Have you had any abnormal blood loss such as black, tarry or bloody stools, or abnormal vaginal bleeding?  12. NO - Have you ever had a blood transfusion?  13. YES - HAVE YOU OR ANY OF YOUR RELATIVES EVER HAD PROBLEMS WITH ANESTHESIA? Hard to awaken  14. NO - Do you have sleep apnea,  excessive snoring or daytime drowsiness?  15. NO - Do you have any prosthetic heart valves?  16. YES - DO YOU HAVE PROSTHETIC JOINTS? Bilateral knees  17. NO - Is there any chance that you may be pregnant?      HPI:     HPI related to upcoming procedure: right hallux rigidus      See problem list for active medical problems.  Problems all longstanding and stable, except as noted/documented.  See ROS for pertinent symptoms related to these conditions.      MEDICAL HISTORY:     Patient Active Problem List    Diagnosis Date Noted     Mild intermittent asthma without complication 04/26/2019     Priority: Medium     History of total bilateral knee replacement 11/20/2017     Priority: Medium     Degenerative arthritis of knee 11/17/2015     Priority: Medium     Health Care Home 01/15/2013     Priority: Medium     State Tier Level:  Tier 1  Status:  na  Care Coordinator:    See Letters for [facility] Care Plan             ACP (advance care planning) 12/03/2011     Priority: Medium     Advance Care Planning 11/10/2015: ACP Review and Resources Provided:  Reviewed chart for advance care plan.  Sarthak VERNON Yash has no plan or code status on file. Discussed available resources and provided with information. Confirmed code status reflects current choices pending further ACP discussions.  Confirmed/documented legally designated decision maker(s). Added by Cecelia Chambers                 FAMILY HX GI MALIGNANCY 09/23/2006     Priority: Medium     MGF had colon cancer       Osteoarthritis 10/24/2005     Priority: Medium     Problem list name updated by automated process. Provider to review       Family history of malignant neoplasm of prostate 06/18/2002     Priority: Medium     Brother        Past Medical History:   Diagnosis Date     Arthritis      Family history of malignant neoplasm of prostate     MGF     FAMILY HX GI MALIGNANCY 9/23/2006    MGF had colon cancer     Mild persistent asthma 1/7/2015     Mild persistent asthma 1/7/2015      Other chronic pain     JOint pain for over 10 years     Uncomplicated asthma      Past Surgical History:   Procedure Laterality Date     ARTHROPLASTY KNEE Left 11/17/2015    Procedure: ARTHROPLASTY KNEE;  Surgeon: Alfie Ingram MD;  Location: [facility] OR     AS TOTAL KNEE ARTHROPLASTY Right      COLONOSCOPY N/A 1/16/2019    Procedure: COLONOSCOPY;  Surgeon: Paul Carvajal MD;  Location:  GI     HC COLONOSCOPY THRU STOMA, DIAGNOSTIC  2009     HC KNEE SCOPE, DIAGNOSTIC  1999    Arthroscopy, Knee   Right  Dr. Ingram     HC REPAIR UMBILICAL LILLIAM,5+Y/O,REDUC  1996     LIGATN/STRIP LONG OR SHORT SAPHEN  2000     Current Outpatient Medications   Medication Sig Dispense Refill     acetaminophen (TYLENOL) 500 MG tablet Take 500-1,000 mg by mouth every 6 hours as needed for mild pain       ibuprofen (ADVIL,MOTRIN) 800 MG tablet Take 1 tablet by mouth every 8 hours as needed for pain. 30 tablet 1     multivitamin, therapeutic with minerals (MULTI-VITAMIN) TABS Take 1 tablet by mouth daily       VENTOLIN  (90 BASE) MCG/ACT Inhaler INHALE 2 PUFFS INTO THE LUNGS EVERY 6 HOURS AS NEEDED FOR SHORTNESS OF BREATH OR DIFFICULT BREATHING 1 Inhaler 0     OTC products: None, except as noted above    Allergies   Allergen Reactions     No Known Allergies       Latex Allergy: NO    Social History     Tobacco Use     Smoking status: Never Smoker     Smokeless tobacco: Never Used   Substance Use Topics     Alcohol use: Yes     Alcohol/week: 2.5 standard drinks     Types: 3 Standard drinks or equivalent per week     Comment: twice a week     History   Drug Use No       REVIEW OF SYSTEMS:   CONSTITUTIONAL: NEGATIVE for fever, chills, change in weight  ENT/MOUTH: NEGATIVE for ear, mouth and throat problems  RESP: NEGATIVE for significant cough or SOB  CV: NEGATIVE for chest pain, palpitations or peripheral edema    EXAM:   /82 (BP Location: Right arm, Patient Position: Sitting, Cuff Size: Adult Large)   Pulse 79   Temp  "98.5  F (36.9  C) (Oral)   Ht 1.765 m (5' 9.5\")   Wt 110.2 kg (243 lb)   SpO2 99%   BMI 35.37 kg/m    GENERAL APPEARANCE: healthy, alert and no distress  HENT: ear canals and TM's normal and nose and mouth without ulcers or lesions  RESP: lungs clear to auscultation - no rales, rhonchi or wheezes  CV: regular rate and rhythm, normal S1 S2, no S3 or S4 and no murmur, click or rub   ABDOMEN: soft, nontender, no HSM or masses and bowel sounds normal  NEURO: Normal strength and tone, sensory exam grossly normal, mentation intact and speech normal    DIAGNOSTICS:     EKG: appears normal, NSR, normal axis, normal intervals, no acute ST/T changes c/w ischemia, no LVH by voltage criteria, unchanged from previous tracings  Labs Resulted Today:   Results for orders placed or performed in visit on 11/06/19   CL AFF HEMOGLOBIN (BFP)     Status: None   Result Value Ref Range    Hemoglobin 16.1 13.3 - 17.7 g/dL       Recent Labs   Lab Test 01/11/18  1011 11/20/15  0602 11/19/15  0552 11/18/15  0705  05/04/15  1136  03/22/12  1527   HGB  --   --  12.3* 13.3   < >  --   --  16.4   PLT  --   --   --   --   --   --   --  212   INR  --  1.59* 1.73* 1.05   < >  --   --   --      --   --   --   --  141   < >  --    POTASSIUM 4.4  --   --   --   --  4.4   < >  --    CR 1.17  --   --   --   --  0.99   < >  --     < > = values in this interval not displayed.        IMPRESSION:   Reason for surgery/procedure: right hallux rigidus    The proposed surgical procedure is considered INTERMEDIATE risk.    REVISED CARDIAC RISK INDEX  The patient has the following serious cardiovascular risks for perioperative complications such as (MI, PE, VFib and 3  AV Block):  No serious cardiac risks  INTERPRETATION: 0 risks: Class I (very low risk - 0.4% complication rate)    The patient has the following additional risks for perioperative complications:  No identified additional risks      ICD-10-CM    1. Preop general physical exam Z01.818 CL AFF " HEMOGLOBIN (BFP)     EKG 12-lead complete w/read - Clinics   2. Hallux rigidus of right foot M20.21 CL AFF HEMOGLOBIN (BFP)     EKG 12-lead complete w/read - Clinics       RECOMMENDATIONS:         --Patient is on no chronic medications    APPROVAL GIVEN to proceed with proposed procedure, without further diagnostic evaluation       Signed Electronically by: Paul Styles MD    Copy of this evaluation report is provided to requesting physician.    [place] Preop Guidelines    Revised Cardiac Risk Index

## 2019-11-06 NOTE — PROGRESS NOTES
[facility]  [address]  [address]  [facility] [address]  [phone]  Dept: [phone]    PRE-OP EVALUATION:  Today's date: 2019    Sarthak Berrios (: 1955) presents for pre-operative evaluation assessment as requested by Dr. Kapoor.  He requires evaluation and anesthesia risk assessment prior to undergoing surgery/procedure for treatment of right hallux fusion.    Proposed Surgery/ Procedure: right hallux fusion  Date of Surgery/ Procedure: 19  Time of Surgery/ Procedure:   Hospital/Surgical Facility: [facility]  Fax number for surgical facility:   Primary Physician: Paul Styles  Type of Anesthesia Anticipated: to be determined    Patient has a Health Care Directive or Living Will:  YES     1. NO - Do you have a history of heart attack, stroke, stent, bypass or surgery on an artery in the head, neck, heart or legs?  2. NO - Do you ever have any pain or discomfort in your chest?  3. NO - Do you have a history of  Heart Failure?  4. NO - Are you troubled by shortness of breath when: walking on the level, up a slight hill or at night?  5. NO - Do you currently have a cold, bronchitis or other respiratory infection?  6. NO - Do you have a cough, shortness of breath or wheezing?  7. NO - Do you sometimes get pains in the calves of your legs when you walk?  8. NO - Do you or anyone in your family have previous history of blood clots?  9. NO - Do you or does anyone in your family have a serious bleeding problem such as prolonged bleeding following surgeries or cuts?  10. NO - Have you ever had problems with anemia or been told to take iron pills?  11. NO - Have you had any abnormal blood loss such as black, tarry or bloody stools, or abnormal vaginal bleeding?  12. NO - Have you ever had a blood transfusion?  13. YES - HAVE YOU OR ANY OF YOUR RELATIVES EVER HAD PROBLEMS WITH ANESTHESIA? Hard to awaken  14. NO - Do you have sleep apnea,  excessive snoring or daytime drowsiness?  15. NO - Do you have any prosthetic heart valves?  16. YES - DO YOU HAVE PROSTHETIC JOINTS? Bilateral knees  17. NO - Is there any chance that you may be pregnant?      HPI:     HPI related to upcoming procedure: right hallux rigidus      See problem list for active medical problems.  Problems all longstanding and stable, except as noted/documented.  See ROS for pertinent symptoms related to these conditions.      MEDICAL HISTORY:     Patient Active Problem List    Diagnosis Date Noted     Mild intermittent asthma without complication 04/26/2019     Priority: Medium     History of total bilateral knee replacement 11/20/2017     Priority: Medium     Degenerative arthritis of knee 11/17/2015     Priority: Medium     Health Care Home 01/15/2013     Priority: Medium     State Tier Level:  Tier 1  Status:  na  Care Coordinator:    See Letters for [facility] Care Plan             ACP (advance care planning) 12/03/2011     Priority: Medium     Advance Care Planning 11/10/2015: ACP Review and Resources Provided:  Reviewed chart for advance care plan.  Sarthak VERNON Yash has no plan or code status on file. Discussed available resources and provided with information. Confirmed code status reflects current choices pending further ACP discussions.  Confirmed/documented legally designated decision maker(s). Added by Cecelia Chambers                 FAMILY HX GI MALIGNANCY 09/23/2006     Priority: Medium     MGF had colon cancer       Osteoarthritis 10/24/2005     Priority: Medium     Problem list name updated by automated process. Provider to review       Family history of malignant neoplasm of prostate 06/18/2002     Priority: Medium     Brother        Past Medical History:   Diagnosis Date     Arthritis      Family history of malignant neoplasm of prostate     MGF     FAMILY HX GI MALIGNANCY 9/23/2006    MGF had colon cancer     Mild persistent asthma 1/7/2015     Mild persistent asthma 1/7/2015      Other chronic pain     JOint pain for over 10 years     Uncomplicated asthma      Past Surgical History:   Procedure Laterality Date     ARTHROPLASTY KNEE Left 11/17/2015    Procedure: ARTHROPLASTY KNEE;  Surgeon: Alfie Ingram MD;  Location: [facility] OR     AS TOTAL KNEE ARTHROPLASTY Right      COLONOSCOPY N/A 1/16/2019    Procedure: COLONOSCOPY;  Surgeon: Paul Carvajal MD;  Location:  GI     HC COLONOSCOPY THRU STOMA, DIAGNOSTIC  2009     HC KNEE SCOPE, DIAGNOSTIC  1999    Arthroscopy, Knee   Right  Dr. Ingram     HC REPAIR UMBILICAL LILLIAM,5+Y/O,REDUC  1996     LIGATN/STRIP LONG OR SHORT SAPHEN  2000     Current Outpatient Medications   Medication Sig Dispense Refill     acetaminophen (TYLENOL) 500 MG tablet Take 500-1,000 mg by mouth every 6 hours as needed for mild pain       ibuprofen (ADVIL,MOTRIN) 800 MG tablet Take 1 tablet by mouth every 8 hours as needed for pain. 30 tablet 1     multivitamin, therapeutic with minerals (MULTI-VITAMIN) TABS Take 1 tablet by mouth daily       VENTOLIN  (90 BASE) MCG/ACT Inhaler INHALE 2 PUFFS INTO THE LUNGS EVERY 6 HOURS AS NEEDED FOR SHORTNESS OF BREATH OR DIFFICULT BREATHING 1 Inhaler 0     OTC products: None, except as noted above    Allergies   Allergen Reactions     No Known Allergies       Latex Allergy: NO    Social History     Tobacco Use     Smoking status: Never Smoker     Smokeless tobacco: Never Used   Substance Use Topics     Alcohol use: Yes     Alcohol/week: 2.5 standard drinks     Types: 3 Standard drinks or equivalent per week     Comment: twice a week     History   Drug Use No       REVIEW OF SYSTEMS:   CONSTITUTIONAL: NEGATIVE for fever, chills, change in weight  ENT/MOUTH: NEGATIVE for ear, mouth and throat problems  RESP: NEGATIVE for significant cough or SOB  CV: NEGATIVE for chest pain, palpitations or peripheral edema    EXAM:   /82 (BP Location: Right arm, Patient Position: Sitting, Cuff Size: Adult Large)   Pulse 79   Temp  "98.5  F (36.9  C) (Oral)   Ht 1.765 m (5' 9.5\")   Wt 110.2 kg (243 lb)   SpO2 99%   BMI 35.37 kg/m    GENERAL APPEARANCE: healthy, alert and no distress  HENT: ear canals and TM's normal and nose and mouth without ulcers or lesions  RESP: lungs clear to auscultation - no rales, rhonchi or wheezes  CV: regular rate and rhythm, normal S1 S2, no S3 or S4 and no murmur, click or rub   ABDOMEN: soft, nontender, no HSM or masses and bowel sounds normal  NEURO: Normal strength and tone, sensory exam grossly normal, mentation intact and speech normal    DIAGNOSTICS:     EKG: appears normal, NSR, normal axis, normal intervals, no acute ST/T changes c/w ischemia, no LVH by voltage criteria, unchanged from previous tracings  Labs Resulted Today:   Results for orders placed or performed in visit on 11/06/19   CL AFF HEMOGLOBIN (BFP)     Status: None   Result Value Ref Range    Hemoglobin 16.1 13.3 - 17.7 g/dL       Recent Labs   Lab Test 01/11/18  1011 11/20/15  0602 11/19/15  0552 11/18/15  0705  05/04/15  1136  03/22/12  1527   HGB  --   --  12.3* 13.3   < >  --   --  16.4   PLT  --   --   --   --   --   --   --  212   INR  --  1.59* 1.73* 1.05   < >  --   --   --      --   --   --   --  141   < >  --    POTASSIUM 4.4  --   --   --   --  4.4   < >  --    CR 1.17  --   --   --   --  0.99   < >  --     < > = values in this interval not displayed.        IMPRESSION:   Reason for surgery/procedure: right hallux rigidus    The proposed surgical procedure is considered INTERMEDIATE risk.    REVISED CARDIAC RISK INDEX  The patient has the following serious cardiovascular risks for perioperative complications such as (MI, PE, VFib and 3  AV Block):  No serious cardiac risks  INTERPRETATION: 0 risks: Class I (very low risk - 0.4% complication rate)    The patient has the following additional risks for perioperative complications:  No identified additional risks      ICD-10-CM    1. Preop general physical exam Z01.818 CL AFF " HEMOGLOBIN (BFP)     EKG 12-lead complete w/read - Clinics   2. Hallux rigidus of right foot M20.21 CL AFF HEMOGLOBIN (BFP)     EKG 12-lead complete w/read - Clinics       RECOMMENDATIONS:         --Patient is on no chronic medications    APPROVAL GIVEN to proceed with proposed procedure, without further diagnostic evaluation       Signed Electronically by: Paul Styles MD    Copy of this evaluation report is provided to requesting physician.    [place] Preop Guidelines    Revised Cardiac Risk Index

## 2019-11-07 DIAGNOSIS — M54.50 BILATERAL LOW BACK PAIN WITHOUT SCIATICA, UNSPECIFIED CHRONICITY: Primary | ICD-10-CM

## 2019-11-07 RX ORDER — CYCLOBENZAPRINE HCL 10 MG
10 TABLET ORAL 3 TIMES DAILY PRN
Qty: 20 TABLET | Refills: 0 | Status: SHIPPED | OUTPATIENT
Start: 2019-11-07 | End: 2020-06-03

## 2019-11-07 NOTE — TELEPHONE ENCOUNTER
Sarthak was seen by Dr. Styles yesterday and he called today requesting a refill for his back spasms. He stated this issue was discussed during his appt yesterday but that he forgot to mention he needed a refill. Please advise, thanks.        Pending Prescriptions:                       Disp   Refills    cyclobenzaprine (FLEXERIL) 10 MG tablet                       Sig: Take 1 tablet (10 mg) by mouth 3 times daily as           needed for muscle spasms

## 2019-11-13 ENCOUNTER — TRANSFERRED RECORDS (OUTPATIENT)
Dept: FAMILY MEDICINE | Facility: CLINIC | Age: 64
End: 2019-11-13

## 2019-12-06 ENCOUNTER — TRANSFERRED RECORDS (OUTPATIENT)
Dept: FAMILY MEDICINE | Facility: CLINIC | Age: 64
End: 2019-12-06

## 2019-12-31 DIAGNOSIS — J45.30 MILD PERSISTENT ASTHMA WITHOUT COMPLICATION: ICD-10-CM

## 2019-12-31 RX ORDER — ALBUTEROL SULFATE 90 UG/1
AEROSOL, METERED RESPIRATORY (INHALATION)
Qty: 1 INHALER | Refills: 0 | Status: SHIPPED | OUTPATIENT
Start: 2019-12-31 | End: 2022-02-08

## 2019-12-31 NOTE — TELEPHONE ENCOUNTER
Pt called asking for a refill of his Ventolin inhaler. Hasn't been filled since 2017.Please advise. Pt stated he rarely uses would just like 1 on hand before the new year and his insurance goes up.    Sarthak Berrios is requesting a refill of:    Pending Prescriptions:                       Disp   Refills    albuterol (VENTOLIN HFA) 108 (90 Base) MC*1 Inha*0            Sig: INHALE 2 PUFFS INTO THE LUNGS EVERY 6 HOURS AS           NEEDED FOR SHORTNESS OF BREATH OR DIFFICULT           BREATHING

## 2020-01-07 ENCOUNTER — TRANSFERRED RECORDS (OUTPATIENT)
Dept: FAMILY MEDICINE | Facility: CLINIC | Age: 65
End: 2020-01-07

## 2020-01-31 ENCOUNTER — TRANSFERRED RECORDS (OUTPATIENT)
Dept: FAMILY MEDICINE | Facility: CLINIC | Age: 65
End: 2020-01-31

## 2020-06-03 ENCOUNTER — OFFICE VISIT (OUTPATIENT)
Dept: FAMILY MEDICINE | Facility: CLINIC | Age: 65
End: 2020-06-03

## 2020-06-03 VITALS
TEMPERATURE: 97.8 F | RESPIRATION RATE: 20 BRPM | HEIGHT: 70 IN | HEART RATE: 88 BPM | WEIGHT: 255.8 LBS | SYSTOLIC BLOOD PRESSURE: 126 MMHG | DIASTOLIC BLOOD PRESSURE: 82 MMHG | BODY MASS INDEX: 36.62 KG/M2

## 2020-06-03 DIAGNOSIS — R20.2 PARESTHESIA: ICD-10-CM

## 2020-06-03 DIAGNOSIS — M75.42 IMPINGEMENT SYNDROME, SHOULDER, LEFT: Primary | ICD-10-CM

## 2020-06-03 PROCEDURE — 99213 OFFICE O/P EST LOW 20 MIN: CPT | Performed by: FAMILY MEDICINE

## 2020-06-03 ASSESSMENT — MIFFLIN-ST. JEOR: SCORE: 1953.61

## 2020-06-03 NOTE — PROGRESS NOTES
SUBJECTIVE:   Sarthak Berrios is a 64 year old male who comes in today for 2 months  left shoulder pain. The pain is located posterior and anterior and is achy and worse with activity .  The pain is worsening Associated symptoms : NOne, no Radiation of the pain  No Numbness in the arm .  The pain is worse with overhead work . Injury history: NKI but did start afer some seated rows at gym .  Therapy to date: rest, occasional ibuprofen    Pt also notes left toes 2 and 3 tingling much if the time- bothersome sensation    Pt had left great toe fusion 11/19 with Dr Kapoor, this symptom started in last 2 weeks    Patient Active Problem List   Diagnosis     Family history of malignant neoplasm of prostate     Osteoarthritis     FAMILY HX GI MALIGNANCY     ACP (advance care planning)     Health Care Home     Degenerative arthritis of knee     History of total bilateral knee replacement     Mild intermittent asthma without complication     Past Medical History:   Diagnosis Date     Arthritis      Family history of malignant neoplasm of prostate     MGF     FAMILY HX GI MALIGNANCY 9/23/2006    MGF had colon cancer     Mild persistent asthma 1/7/2015     Mild persistent asthma 1/7/2015     Other chronic pain     JOint pain for over 10 years     Uncomplicated asthma      Family History   Problem Relation Age of Onset     Alcohol/Drug Father      Cancer Maternal Grandfather         Prostate     Cancer Brother         Bladder     Cardiovascular No family hx of      Diabetes No family hx of      Cancer - colorectal No family hx of      Breast Cancer No family hx of      Social History     Socioeconomic History     Marital status:      Spouse name: Lindy     Number of children: 5     Years of education: 16     Highest education level: Not on file   Occupational History     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     Comment: [employer]     Employer:     Social Needs     Financial resource strain: Not on file      Food insecurity     Worry: Not on file     Inability: Not on file     Transportation needs     Medical: Not on file     Non-medical: Not on file   Tobacco Use     Smoking status: Never Smoker     Smokeless tobacco: Never Used   Substance and Sexual Activity     Alcohol use: Yes     Alcohol/week: 2.5 standard drinks     Types: 3 Standard drinks or equivalent per week     Comment: twice a week     Drug use: No     Sexual activity: Yes     Partners: Female     Birth control/protection: Post-menopausal   Lifestyle     Physical activity     Days per week: Not on file     Minutes per session: Not on file     Stress: Not on file   Relationships     Social connections     Talks on phone: Not on file     Gets together: Not on file     Attends Gnosticism service: Not on file     Active member of club or organization: Not on file     Attends meetings of clubs or organizations: Not on file     Relationship status: Not on file     Intimate partner violence     Fear of current or ex partner: Not on file     Emotionally abused: Not on file     Physically abused: Not on file     Forced sexual activity: Not on file   Other Topics Concern      Service Not Asked     Blood Transfusions Not Asked     Caffeine Concern Not Asked     Occupational Exposure Not Asked     Hobby Hazards Not Asked     Sleep Concern Not Asked     Stress Concern Not Asked     Weight Concern Not Asked     Special Diet Not Asked     Back Care Not Asked     Exercise Yes     Bike Helmet Not Asked     Seat Belt Yes     Self-Exams Yes     Parent/sibling w/ CABG, MI or angioplasty before 65F 55M? Not Asked   Social History Narrative     Not on file     Past Surgical History:   Procedure Laterality Date     ARTHROPLASTY KNEE Left 11/17/2015    Procedure: ARTHROPLASTY KNEE;  Surgeon: Alfie Ingram MD;  Location:  OR     AS TOTAL KNEE ARTHROPLASTY Right      COLONOSCOPY N/A 1/16/2019    Procedure: COLONOSCOPY;  Surgeon: Paul Carvajal MD;  Location:  GI  "    HC COLONOSCOPY THRU STOMA, DIAGNOSTIC  2009     HC KNEE SCOPE, DIAGNOSTIC  1999    Arthroscopy, Knee   Right  Dr. Ingram     HC REPAIR UMBILICAL LILLIAM,5+Y/O,REDUC  1996     LIGATN/STRIP LONG OR SHORT SAPHEN  2000     albuterol (VENTOLIN HFA) 108 (90 Base) MCG/ACT inhaler, INHALE 2 PUFFS INTO THE LUNGS EVERY 6 HOURS AS NEEDED FOR SHORTNESS OF BREATH OR DIFFICULT BREATHING  multivitamin, therapeutic with minerals (MULTI-VITAMIN) TABS, Take 1 tablet by mouth daily    No current facility-administered medications on file prior to visit.        Allergies: No known allergies    Immunization History   Administered Date(s) Administered     Influenza (IIV3) PF 11/24/2003     Influenza (intradermal) 10/08/2015     Influenza Vaccine IM > 6 months Valent IIV4 10/13/2016, 10/12/2017, 09/20/2019     Influenza Vaccine, 6+MO IM (QUADRIVALENT W/PRESERVATIVES) 10/03/2017, 10/04/2018     TD (ADULT, 7+) 03/28/2003     TDAP Vaccine (Boostrix) 12/03/2011     .  OBJECTIVE:  /82 (BP Location: Right arm, Patient Position: Chair, Cuff Size: Adult Large)   Pulse 88   Temp 97.8  F (36.6  C) (Oral)   Resp 20   Ht 1.765 m (5' 9.5\")   Wt 116 kg (255 lb 12.8 oz)   BMI 37.23 kg/m    Inspection (musculature): normal   Palpation: no pain  ROM : Normal flexion to 180 degrees, Normal extension to 50 degrees, Normal abduction to 170 degrees, Normal external rotation  and Abnormal internal rotation , Normal passive motion and No pain with supraspinatous isolation  Tests : Pos ECS on left  Negative apprehension sign, Negative load and shift and Negative sulcus sign for instability, Positive Neer's test and Positive Rudd-Israel for impingement  Remainder of ipsilateral arm, wrist, and hand normal.    Toe crowding left, neg tarsal tunnel tests    xray: deferred    ASSESSMENT:  (M75.42) Impingement syndrome, shoulder, left  (primary encounter diagnosis)  Comment: pt with left impingement, no signs weakness or complete tears-given 2 months " suspect he should see ortho  Plan: ORTHOPEDICS PEDS REFERRAL            (R20.2) Paresthesia  Comment: pt with toe crowding on exam-suspect compression neuropathy  Plan: ORTHOPEDICS PEDS REFERRAL        See Dr Kapoor again

## 2020-06-03 NOTE — NURSING NOTE
Sarthak Berrios is here for left arm/shoulder pain for the past month. Also spot on right toe that he wants looked at.    Questioned patient about current smoking habits.  Pt. has never smoked.  PULSE regular  My Chart: active  CLASSIFICATION OF OVERWEIGHT AND OBESITY BY BMI                        Obesity Class           BMI(kg/m2)  Underweight                                    < 18.5  Normal                                         18.5-24.9  Overweight                                     25.0-29.9  OBESITY                     I                  30.0-34.9                             II                 35.0-39.9  EXTREME OBESITY             III                >40                            Patient's  BMI Body mass index is 37.23 kg/m .  http://hin.nhlbi.nih.gov/menuplanner/menu.cgi  Pre-visit planning  Immunizations - up to date  Colonoscopy - is up to date  Mammogram -   Asthma -   PHQ9 -    TIM-7 -

## 2020-06-04 ENCOUNTER — TRANSFERRED RECORDS (OUTPATIENT)
Dept: FAMILY MEDICINE | Facility: CLINIC | Age: 65
End: 2020-06-04

## 2020-06-04 ASSESSMENT — ASTHMA QUESTIONNAIRES: ACT_TOTALSCORE: 24

## 2020-06-25 ENCOUNTER — TRANSFERRED RECORDS (OUTPATIENT)
Dept: FAMILY MEDICINE | Facility: CLINIC | Age: 65
End: 2020-06-25

## 2020-11-02 ENCOUNTER — OFFICE VISIT (OUTPATIENT)
Dept: FAMILY MEDICINE | Facility: CLINIC | Age: 65
End: 2020-11-02

## 2020-11-02 VITALS
TEMPERATURE: 97.7 F | HEIGHT: 70 IN | BODY MASS INDEX: 34.19 KG/M2 | WEIGHT: 238.8 LBS | OXYGEN SATURATION: 98 % | SYSTOLIC BLOOD PRESSURE: 130 MMHG | HEART RATE: 94 BPM | DIASTOLIC BLOOD PRESSURE: 70 MMHG

## 2020-11-02 DIAGNOSIS — G57.81 INTERDIGITAL NEUROMA OF RIGHT FOOT: ICD-10-CM

## 2020-11-02 DIAGNOSIS — Z23 NEED FOR VACCINATION: ICD-10-CM

## 2020-11-02 DIAGNOSIS — Z01.818 PREOPERATIVE EXAMINATION: Primary | ICD-10-CM

## 2020-11-02 LAB — HEMOGLOBIN: 16.6 G/DL (ref 13.3–17.7)

## 2020-11-02 PROCEDURE — 99214 OFFICE O/P EST MOD 30 MIN: CPT | Mod: 25 | Performed by: FAMILY MEDICINE

## 2020-11-02 PROCEDURE — 90471 IMMUNIZATION ADMIN: CPT | Performed by: FAMILY MEDICINE

## 2020-11-02 PROCEDURE — 90686 IIV4 VACC NO PRSV 0.5 ML IM: CPT | Performed by: FAMILY MEDICINE

## 2020-11-02 PROCEDURE — 85018 HEMOGLOBIN: CPT | Performed by: FAMILY MEDICINE

## 2020-11-02 PROCEDURE — 36415 COLL VENOUS BLD VENIPUNCTURE: CPT | Performed by: FAMILY MEDICINE

## 2020-11-02 RX ORDER — IBUPROFEN 600 MG/1
TABLET, FILM COATED ORAL
COMMUNITY
Start: 2019-11-19 | End: 2022-02-08

## 2020-11-02 ASSESSMENT — MIFFLIN-ST. JEOR: SCORE: 1879.44

## 2020-11-02 NOTE — PROGRESS NOTES
[facility]  [address]  [address]  [facility] [address]  [phone]  Dept: [phone]    PRE-OP EVALUATION:  Today's date: 2020    Sarthak Berrios (: 1955) presents for pre-operative evaluation assessment as requested by Dr. Nam Kapoor.  He requires evaluation and anesthesia risk assessment prior to undergoing surgery/procedure for treatment of Toe surgery -fusion and interdigital neuroma.    Proposed Surgery/ Procedure: Left foot great toe fusion  Date of Surgery/ Procedure: 2020  Time of Surgery/ Procedure:   [facility]  Surgery Fax Number: [phone]  Primary Physician: Paul Styles  Type of Anesthesia Anticipated: to be determined    Preoperative Questionnaire:   No - Have you ever had a heart attack or stroke?  No - Have you ever had surgery on your heart or blood vessels, such as a stent, coronary (heart) bypass, or surgery on an artery in the head, neck, heart, or legs?  No - Do you have chest pain when you are physically active?  No - Do you have a history of heart failure?  No - Do you currently have a cold, bronchitis, or symptoms of other respiratory (head and chest) infections?  No - Do you have a cough, shortness of breath, or wheezing?  No - Do you or anyone in your family have a history of blood clots?  No - Do you or anyone in your family have a serious bleeding problem, such as long-lasting bleeding after surgeries or cuts?  No - Have you ever had anemia or been told to take iron pills?  No - Have you had any abnormal blood loss such as black, tarry or bloody stools, or abnormal vaginal bleeding?  No - Have you ever had a blood transfusion?  Yes - Are you willing to have a blood transfusion if it is medically needed before, during, or after your surgery?  YES - Have you or anyone in your family ever had problems with anesthesia (sedation for surgery)?-difficult to awaken in  past  No - Do you have sleep apnea, excessive snoring, or daytime drowsiness?   No - Do you have any artifical heart valves or other implanted medical devices, such as a pacemaker, defibrillator, or continuous glucose monitor?  YES - Do you have any artifical joints?knees  No - Are you allergic to latex?  No - Is there any chance that you may be pregnant?    Patient has a Health Care Directive or Living Will:  YES will    HPI:     HPI related to upcoming procedure: toe surgery, both feet      See problem list for active medical problems.  Problems all longstanding and stable, except as noted/documented.  See ROS for pertinent symptoms related to these conditions.      MEDICAL HISTORY:     Patient Active Problem List    Diagnosis Date Noted     Mild intermittent asthma without complication 04/26/2019     Priority: Medium     History of total bilateral knee replacement 11/20/2017     Priority: Medium     Degenerative arthritis of knee 11/17/2015     Priority: Medium     Health Care Home 01/15/2013     Priority: Medium     State Tier Level:  Tier 1  Status:  na  Care Coordinator:    See Letters for [facility] Care Plan             ACP (advance care planning) 12/03/2011     Priority: Medium     Advance Care Planning 11/10/2015: ACP Review and Resources Provided:  Reviewed chart for advance care plan.  Sarthak Berrios has no plan or code status on file. Discussed available resources and provided with information. Confirmed code status reflects current choices pending further ACP discussions.  Confirmed/documented legally designated decision maker(s). Added by Cecelia Chambers                 FAMILY HX GI MALIGNANCY 09/23/2006     Priority: Medium     MGF had colon cancer       Osteoarthritis 10/24/2005     Priority: Medium     Problem list name updated by automated process. Provider to review       Family history of malignant neoplasm of prostate 06/18/2002     Priority: Medium     Brother        Past Medical History:   Diagnosis  Date     Arthritis      Family history of malignant neoplasm of prostate     MGF     FAMILY HX GI MALIGNANCY 9/23/2006    MGF had colon cancer     Mild persistent asthma 1/7/2015     Mild persistent asthma 1/7/2015     Other chronic pain     JOint pain for over 10 years     Uncomplicated asthma      Past Surgical History:   Procedure Laterality Date     ARTHROPLASTY KNEE Left 11/17/2015    Procedure: ARTHROPLASTY KNEE;  Surgeon: Alfie Ingram MD;  Location: [facility] OR     AS TOTAL KNEE ARTHROPLASTY Right      COLONOSCOPY N/A 1/16/2019    Procedure: COLONOSCOPY;  Surgeon: Paul Carvajal MD;  Location: [facility] GI     HC COLONOSCOPY THRU STOMA, DIAGNOSTIC  2009     HC KNEE SCOPE, DIAGNOSTIC  1999    Arthroscopy, Knee   Right  Dr. Ingram     HC REPAIR UMBILICAL LILLIAM,5+Y/O,REDUC  1996     LIGATN/STRIP LONG OR SHORT SAPHEN  2000     Current Outpatient Medications   Medication Sig Dispense Refill     albuterol (VENTOLIN HFA) 108 (90 Base) MCG/ACT inhaler INHALE 2 PUFFS INTO THE LUNGS EVERY 6 HOURS AS NEEDED FOR SHORTNESS OF BREATH OR DIFFICULT BREATHING 1 Inhaler 0     multivitamin, therapeutic with minerals (MULTI-VITAMIN) TABS Take 1 tablet by mouth daily       OTC products: None, except as noted above    Allergies   Allergen Reactions     No Known Allergies       Latex Allergy: NO    Social History     Tobacco Use     Smoking status: Never Smoker     Smokeless tobacco: Never Used   Substance Use Topics     Alcohol use: Yes     Alcohol/week: 2.5 standard drinks     Types: 3 Standard drinks or equivalent per week     Comment: twice a week     History   Drug Use No       REVIEW OF SYSTEMS:   CONSTITUTIONAL: NEGATIVE for fever, chills, change in weight  ENT/MOUTH: NEGATIVE for ear, mouth and throat problems  RESP: NEGATIVE for significant cough or SOB  CV: NEGATIVE for chest pain, palpitations or peripheral edema    EXAM:   /70 (BP Location: Right arm, Patient Position: Sitting, Cuff Size: Adult Large)   Pulse 94    "Temp 97.7  F (36.5  C) (Oral)   Ht 1.778 m (5' 10\")   Wt 108.3 kg (238 lb 12.8 oz)   SpO2 98%   BMI 34.26 kg/m    GENERAL APPEARANCE: healthy, alert and no distress  HENT: ear canals and TM's normal and nose and mouth without ulcers or lesions  RESP: lungs clear to auscultation - no rales, rhonchi or wheezes  CV: regular rate and rhythm, normal S1 S2, no S3 or S4 and no murmur, click or rub   ABDOMEN: soft, nontender, no HSM or masses and bowel sounds normal  NEURO: Normal strength and tone, sensory exam grossly normal, mentation intact and speech normal    DIAGNOSTICS:     EKG: Not indicated due to non-vascular surgery and low risk of event (age <65 and without cardiac risk factors)  Labs Resulted Today:   Results for orders placed or performed in visit on 11/02/20   HEMOGLOBIN (BFP)     Status: None   Result Value Ref Range    Hemoglobin 16.6 13.3 - 17.7 g/dL       Recent Labs   Lab Test 11/06/19 01/11/18  1011 11/20/15  0602 11/19/15  0552 05/04/15  1136 05/04/15  1136   HGB 16.1  --   --  12.3*   < >  --    INR  --   --  1.59* 1.73*   < >  --    NA  --  141  --   --   --  141   POTASSIUM  --  4.4  --   --   --  4.4   CR  --  1.17  --   --   --  0.99    < > = values in this interval not displayed.        IMPRESSION:   Reason for surgery/procedure: left toe fusion right toe neuroma    The proposed surgical procedure is considered INTERMEDIATE risk.    REVISED CARDIAC RISK INDEX  The patient has the following serious cardiovascular risks for perioperative complications such as (MI, PE, VFib and 3  AV Block):  No serious cardiac risks  INTERPRETATION: 0 risks: Class I (very low risk - 0.4% complication rate)    The patient has the following additional risks for perioperative complications:  No identified additional risks      ICD-10-CM    1. Preoperative examination  Z01.818    2. Interdigital neuroma of right foot  G57.81    3. Need for vaccination  Z23 FLU VAC PRESRV FREE QUAD SPLIT VIR 3+YRS IM "       RECOMMENDATIONS:         --Patient is on no chronic medications    APPROVAL GIVEN to proceed with proposed procedure, without further diagnostic evaluation       Signed Electronically by: Paul Styles MD    Copy of this evaluation report is provided to requesting physician.    [facility] Preop Guidelines    Revised Cardiac Risk Index

## 2020-12-17 ENCOUNTER — OFFICE VISIT (OUTPATIENT)
Dept: FAMILY MEDICINE | Facility: CLINIC | Age: 65
End: 2020-12-17

## 2020-12-17 VITALS
HEART RATE: 92 BPM | DIASTOLIC BLOOD PRESSURE: 86 MMHG | RESPIRATION RATE: 20 BRPM | BODY MASS INDEX: 34.33 KG/M2 | SYSTOLIC BLOOD PRESSURE: 138 MMHG | TEMPERATURE: 97.8 F | HEIGHT: 70 IN | WEIGHT: 239.8 LBS

## 2020-12-17 DIAGNOSIS — R07.89 CHEST WALL PAIN: Primary | ICD-10-CM

## 2020-12-17 PROCEDURE — 99213 OFFICE O/P EST LOW 20 MIN: CPT | Performed by: FAMILY MEDICINE

## 2020-12-17 ASSESSMENT — MIFFLIN-ST. JEOR: SCORE: 1883.98

## 2020-12-17 NOTE — PROGRESS NOTES
SUBJECTIVE: Sarthak Berrios is a 65 year old male who presents for point tenderness left upper chest near midline starting in middle of night.  It did not wake him from sleep but started when he woke up. .Pain mild when left alone but worse if presses on area.  NO shortness of breath  No other significant past medical history or family history. Radiating pain. NO fevers or chills.  No dizziness. NO lightheadness on standing.    NO recent exercise or lifting-no known trigger    Slightly worse with deep breath and extension of neck    Pt has not tried any meds.    Patient Active Problem List   Diagnosis     Family history of malignant neoplasm of prostate     Osteoarthritis     FAMILY HX GI MALIGNANCY     ACP (advance care planning)     Health Care Home     Degenerative arthritis of knee     History of total bilateral knee replacement     Mild intermittent asthma without complication     Past Medical History:   Diagnosis Date     Arthritis      Family history of malignant neoplasm of prostate     MGF     FAMILY HX GI MALIGNANCY 9/23/2006    MGF had colon cancer     Mild persistent asthma 1/7/2015     Mild persistent asthma 1/7/2015     Other chronic pain     JOint pain for over 10 years     Uncomplicated asthma      Family History   Problem Relation Age of Onset     Alcohol/Drug Father      Cancer Maternal Grandfather         Prostate     Cancer Brother         Bladder     Cardiovascular No family hx of      Diabetes No family hx of      Cancer - colorectal No family hx of      Breast Cancer No family hx of      Social History     Socioeconomic History     Marital status:      Spouse name: Lindy     Number of children: 5     Years of education: 16     Highest education level: Not on file   Occupational History     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     Comment: [employer]     Employer:     Social Needs     Financial resource strain: Not on file     Food insecurity     Worry: Not on file      Inability: Not on file     Transportation needs     Medical: Not on file     Non-medical: Not on file   Tobacco Use     Smoking status: Never Smoker     Smokeless tobacco: Never Used   Substance and Sexual Activity     Alcohol use: Yes     Alcohol/week: 2.5 standard drinks     Types: 3 Standard drinks or equivalent per week     Comment: twice a week     Drug use: No     Sexual activity: Yes     Partners: Female     Birth control/protection: Post-menopausal   Lifestyle     Physical activity     Days per week: Not on file     Minutes per session: Not on file     Stress: Not on file   Relationships     Social connections     Talks on phone: Not on file     Gets together: Not on file     Attends [employer] service: Not on file     Active member of club or organization: Not on file     Attends meetings of clubs or organizations: Not on file     Relationship status: Not on file     Intimate partner violence     Fear of current or ex partner: Not on file     Emotionally abused: Not on file     Physically abused: Not on file     Forced sexual activity: Not on file   Other Topics Concern      Service Not Asked     Blood Transfusions Not Asked     Caffeine Concern Not Asked     Occupational Exposure Not Asked     Hobby Hazards Not Asked     Sleep Concern Not Asked     Stress Concern Not Asked     Weight Concern Not Asked     Special Diet Not Asked     Back Care Not Asked     Exercise Yes     Bike Helmet Not Asked     Seat Belt Yes     Self-Exams Yes     Parent/sibling w/ CABG, MI or angioplasty before 65F 55M? Not Asked   Social History Narrative     Not on file     Past Surgical History:   Procedure Laterality Date     ARTHROPLASTY KNEE Left 11/17/2015    Procedure: ARTHROPLASTY KNEE;  Surgeon: Alfie Ingram MD;  Location:  OR     AS TOTAL KNEE ARTHROPLASTY Right      COLONOSCOPY N/A 1/16/2019    Procedure: COLONOSCOPY;  Surgeon: Paul Carvajal MD;  Location:  GI     HC COLONOSCOPY THRU STOMA, DIAGNOSTIC   "2009     HC KNEE SCOPE, DIAGNOSTIC  1999    Arthroscopy, Knee   Right  Dr. Ingram      REPAIR UMBILICAL LILLIAM,5+Y/O,REDUC  1996     LIGATN/STRIP LONG OR SHORT SAPHEN  2000          albuterol (VENTOLIN HFA) 108 (90 Base) MCG/ACT inhaler, INHALE 2 PUFFS INTO THE LUNGS EVERY 6 HOURS AS NEEDED FOR SHORTNESS OF BREATH OR DIFFICULT BREATHING       ibuprofen (ADVIL/MOTRIN) 600 MG tablet, TK 1 T PO Q 6 H WF       multivitamin, therapeutic with minerals (MULTI-VITAMIN) TABS, Take 1 tablet by mouth daily    No current facility-administered medications on file prior to visit.        Allergies: No known allergies    Immunization History   Administered Date(s) Administered     Influenza (IIV3) PF 11/24/2003     Influenza (intradermal) 10/08/2015     Influenza Vaccine IM > 6 months Valent IIV4 10/13/2016, 10/12/2017, 09/20/2019, 11/02/2020     Influenza Vaccine, 6+MO IM (QUADRIVALENT W/PRESERVATIVES) 10/03/2017, 10/04/2018     TD (ADULT, 7+) 03/28/2003     TDAP Vaccine (Boostrix) 12/03/2011      OBJECTIVE:   /86 (BP Location: Left arm, Patient Position: Chair, Cuff Size: Adult Large)   Pulse 92   Temp 97.8  F (36.6  C)   Resp 20   Ht 1.778 m (5' 10\")   Wt 108.8 kg (239 lb 12.8 oz)   BMI 34.41 kg/m     S1 and S2 normal, no murmurs, clicks, gallops or rubs. Regular rate and rhythm. Chest is clear; no wheezes or rales. No edema or JVD.   The neck is supple and free of adenopathy or masses, the thyroid is normal without enlargement or nodules.   MS- pt has point tenderness over left sternoclavicular joint  The abdomen is soft without tenderness, guarding, mass, rebound or organomegaly. Bowel sounds are normal. No CVA tenderness or inguinal adenopathy noted.     ASSESSMENT: /PLAN:   (R07.89) Chest wall pain  (primary encounter diagnosis)  Comment: pt symptoms highly suggest acute inflammatory chest wall issue-localized to sternoclavicular joint-ddx inludes Tietzes disease, sternalis syndrome-no worrisome findings-suspect " this will settle down with anitinflammatories  Plan: recommend ibuprofen with food regularly for 2 days, rest, f/u if not improving or worsening

## 2020-12-17 NOTE — NURSING NOTE
Questioned patient about current smoking habits.  Pt. has never smoked.  PULSE regular  My Chart: active  CLASSIFICATION OF OVERWEIGHT AND OBESITY BY BMI                        Obesity Class           BMI(kg/m2)  Underweight                                    < 18.5  Normal                                         18.5-24.9  Overweight                                     25.0-29.9  OBESITY                     I                  30.0-34.9                             II                 35.0-39.9  EXTREME OBESITY             III                >40                            Patient's  BMI Body mass index is 34.41 kg/m .  http://hin.nhlbi.nih.gov/menuplanner/menu.cgi  Pre-visit planning  Immunizations - up to date  Colonoscopy - is up to date  Mammogram -   Asthma -   PHQ9 -    TIM-7 -

## 2021-01-15 ENCOUNTER — HEALTH MAINTENANCE LETTER (OUTPATIENT)
Age: 66
End: 2021-01-15

## 2021-01-15 ENCOUNTER — OFFICE VISIT (OUTPATIENT)
Dept: FAMILY MEDICINE | Facility: CLINIC | Age: 66
End: 2021-01-15

## 2021-01-15 VITALS
SYSTOLIC BLOOD PRESSURE: 150 MMHG | BODY MASS INDEX: 34.41 KG/M2 | DIASTOLIC BLOOD PRESSURE: 90 MMHG | TEMPERATURE: 97.9 F | OXYGEN SATURATION: 99 % | HEIGHT: 70 IN | HEART RATE: 79 BPM

## 2021-01-15 DIAGNOSIS — K13.0 ANGULAR CHEILITIS: Primary | ICD-10-CM

## 2021-01-15 DIAGNOSIS — R03.0 ELEVATED BLOOD PRESSURE READING WITHOUT DIAGNOSIS OF HYPERTENSION: ICD-10-CM

## 2021-01-15 PROCEDURE — 99214 OFFICE O/P EST MOD 30 MIN: CPT | Performed by: FAMILY MEDICINE

## 2021-01-15 RX ORDER — NYSTATIN 100000 U/G
CREAM TOPICAL 2 TIMES DAILY
Qty: 30 G | Refills: 0 | Status: SHIPPED | OUTPATIENT
Start: 2021-01-15 | End: 2021-01-29

## 2021-01-15 NOTE — PATIENT INSTRUCTIONS
"Assessment & Plan   Problem List Items Addressed This Visit        Other    Elevated blood pressure reading without diagnosis of hypertension      Other Visit Diagnoses     Angular cheilitis    -  Primary    Relevant Medications    nystatin (MYCOSTATIN) 211098 UNIT/GM external cream         1. Angular cheilitis  Treat with topical nystatin cream. Call If not improving after a couple of weeks.  - nystatin (MYCOSTATIN) 977057 UNIT/GM external cream; Apply topically 2 times daily for 14 days  Dispense: 30 g; Refill: 0    2. Elevated blood pressure reading without diagnosis of hypertension  Watch at home. Come back in to recheck if persistently higher than 130/80         :[id number]}     BMI:   Estimated body mass index is 34.41 kg/m  as calculated from the following:    Height as of this encounter: 1.778 m (5' 10\").    Weight as of 12/17/20: 108.8 kg (239 lb 12.8 oz).   Weight management plan: Discussed healthy diet and exercise guidelines      FUTURE APPOINTMENTS:       - Follow-up visit as needed.        Coleen Pacheco MD  [facility]    "

## 2021-01-15 NOTE — NURSING NOTE
Sarthak is here for a sore on hi lip x 1 mouth    Pre-visit Screening:  Immunizations:  up to date  Colonoscopy:  is up to date  Mammogram: NA  Asthma Action Test/Plan:  NA  PHQ9:  None  GAD7:  None  Questioned patient about current smoking habits Pt. has never smoked.  Ok to leave detailed message on voice mail for today's visit only Yes, phone # [phone]

## 2021-01-15 NOTE — PROGRESS NOTES
"Assessment & Plan   Problem List Items Addressed This Visit        Other    Elevated blood pressure reading without diagnosis of hypertension      Other Visit Diagnoses     Angular cheilitis    -  Primary    Relevant Medications    nystatin (MYCOSTATIN) 673324 UNIT/GM external cream         1. Angular cheilitis  Treat with topical nystatin cream. Call If not improving after a couple of weeks.  - nystatin (MYCOSTATIN) 390725 UNIT/GM external cream; Apply topically 2 times daily for 14 days  Dispense: 30 g; Refill: 0    2. Elevated blood pressure reading without diagnosis of hypertension  Watch at home. Come back in to recheck if persistently higher than 130/80         :[id number]}     BMI:   Estimated body mass index is 34.41 kg/m  as calculated from the following:    Height as of this encounter: 1.778 m (5' 10\").    Weight as of 12/17/20: 108.8 kg (239 lb 12.8 oz).   Weight management plan: Discussed healthy diet and exercise guidelines      FUTURE APPOINTMENTS:       - Follow-up visit as needed.        Coleen Pacheco MD  [facility]    Subjective     Sarthak Berrios is a 65 year old male who presents to clinic today for the following health issues     HPI     Right corner of the mouth, cracked skin,hurts, for a few weeks, using otc neosporin but it's not getting better. Wants to know what to do next    Review of Systems   Constitutional, HEENT, cardiovascular, pulmonary, gi and gu systems are negative, except as otherwise noted.      Objective    BP (!) 150/90 (BP Location: Right arm, Patient Position: Sitting, Cuff Size: Adult Large)   Pulse 79   Temp 97.9  F (36.6  C) (Oral)   Ht 1.778 m (5' 10\")   SpO2 99%   BMI 34.41 kg/m    Body mass index is 34.41 kg/m .  Physical Exam   GENERAL: healthy, alert and no distress  HENT: normal cephalic/atraumatic, nose and mouth without ulcers or lesions and cracked lesion on right corner of mouth, no active bleeding  MS: no gross musculoskeletal defects " noted, no edema

## 2021-03-25 ENCOUNTER — IMMUNIZATION (OUTPATIENT)
Dept: NURSING | Facility: CLINIC | Age: 66
End: 2021-03-25
Payer: COMMERCIAL

## 2021-03-25 PROCEDURE — 0001A PR COVID VAC PFIZER DIL RECON 30 MCG/0.3 ML IM: CPT

## 2021-03-25 PROCEDURE — 91300 PR COVID VAC PFIZER DIL RECON 30 MCG/0.3 ML IM: CPT

## 2021-04-15 ENCOUNTER — IMMUNIZATION (OUTPATIENT)
Dept: NURSING | Facility: CLINIC | Age: 66
End: 2021-04-15
Attending: FAMILY MEDICINE
Payer: COMMERCIAL

## 2021-04-15 PROCEDURE — 91300 PR COVID VAC PFIZER DIL RECON 30 MCG/0.3 ML IM: CPT

## 2021-04-15 PROCEDURE — 0002A PR COVID VAC PFIZER DIL RECON 30 MCG/0.3 ML IM: CPT

## 2021-09-04 ENCOUNTER — HEALTH MAINTENANCE LETTER (OUTPATIENT)
Age: 66
End: 2021-09-04

## 2021-10-30 ENCOUNTER — HEALTH MAINTENANCE LETTER (OUTPATIENT)
Age: 66
End: 2021-10-30

## 2021-11-30 ENCOUNTER — OFFICE VISIT (OUTPATIENT)
Dept: FAMILY MEDICINE | Facility: CLINIC | Age: 66
End: 2021-11-30

## 2021-11-30 VITALS
SYSTOLIC BLOOD PRESSURE: 138 MMHG | BODY MASS INDEX: 34.5 KG/M2 | TEMPERATURE: 97.9 F | HEIGHT: 70 IN | WEIGHT: 241 LBS | DIASTOLIC BLOOD PRESSURE: 90 MMHG | OXYGEN SATURATION: 96 % | RESPIRATION RATE: 22 BRPM | HEART RATE: 82 BPM

## 2021-11-30 DIAGNOSIS — Z12.5 SPECIAL SCREENING FOR MALIGNANT NEOPLASM OF PROSTATE: ICD-10-CM

## 2021-11-30 DIAGNOSIS — R03.0 ELEVATED BLOOD PRESSURE READING WITHOUT DIAGNOSIS OF HYPERTENSION: ICD-10-CM

## 2021-11-30 DIAGNOSIS — J45.20 MILD INTERMITTENT ASTHMA WITHOUT COMPLICATION: ICD-10-CM

## 2021-11-30 DIAGNOSIS — E66.01 MORBID OBESITY (H): ICD-10-CM

## 2021-11-30 DIAGNOSIS — Z00.00 ROUTINE GENERAL MEDICAL EXAMINATION AT A HEALTH CARE FACILITY: Primary | ICD-10-CM

## 2021-11-30 LAB
ALBUMIN SERPL-MCNC: 4.6 G/DL (ref 3.6–5.1)
ALBUMIN/GLOB SERPL: 1.9 {RATIO} (ref 1–2.5)
ALP SERPL-CCNC: 50 U/L (ref 33–130)
ALT 1742-6: 60 U/L (ref 0–32)
AST 1920-8: 31 U/L (ref 0–35)
BILIRUB SERPL-MCNC: 1.6 MG/DL (ref 0.2–1.2)
BUN SERPL-MCNC: 13 MG/DL (ref 7–25)
BUN/CREATININE RATIO: 12.6 (ref 6–22)
CALCIUM SERPL-MCNC: 9.9 MG/DL (ref 8.6–10.3)
CHLORIDE SERPLBLD-SCNC: 105.5 MMOL/L (ref 98–110)
CHOLEST SERPL-MCNC: 200 MG/DL (ref 0–199)
CHOLEST/HDLC SERPL: 4 {RATIO} (ref 0–5)
CO2 SERPL-SCNC: 30.8 MMOL/L (ref 20–32)
CREAT SERPL-MCNC: 1.03 MG/DL (ref 0.6–1.3)
GLOBULIN, CALCULATED - QUEST: 2.4 (ref 1.9–3.7)
GLUCOSE SERPL-MCNC: 97 MG/DL (ref 60–99)
HDLC SERPL-MCNC: 57 MG/DL (ref 40–150)
LDLC SERPL CALC-MCNC: 120 MG/DL (ref 0–130)
POTASSIUM SERPL-SCNC: 4.95 MMOL/L (ref 3.5–5.3)
PROT SERPL-MCNC: 7 G/DL (ref 6.1–8.1)
SODIUM SERPL-SCNC: 142.7 MMOL/L (ref 135–146)
TRIGL SERPL-MCNC: 116 MG/DL (ref 0–149)

## 2021-11-30 PROCEDURE — 84153 ASSAY OF PSA TOTAL: CPT | Performed by: FAMILY MEDICINE

## 2021-11-30 PROCEDURE — 80053 COMPREHEN METABOLIC PANEL: CPT | Performed by: FAMILY MEDICINE

## 2021-11-30 PROCEDURE — 90662 IIV NO PRSV INCREASED AG IM: CPT | Performed by: FAMILY MEDICINE

## 2021-11-30 PROCEDURE — 99397 PER PM REEVAL EST PAT 65+ YR: CPT | Mod: 25 | Performed by: FAMILY MEDICINE

## 2021-11-30 PROCEDURE — 36415 COLL VENOUS BLD VENIPUNCTURE: CPT | Performed by: FAMILY MEDICINE

## 2021-11-30 PROCEDURE — 80061 LIPID PANEL: CPT | Performed by: FAMILY MEDICINE

## 2021-11-30 PROCEDURE — 90471 IMMUNIZATION ADMIN: CPT | Performed by: FAMILY MEDICINE

## 2021-11-30 ASSESSMENT — MIFFLIN-ST. JEOR: SCORE: 1876.48

## 2021-11-30 NOTE — NURSING NOTE
Chief Complaint   Patient presents with     Physical     annual, fasting      Pre-visit Screening:  Immunizations:  not up to date - due for pneumo and flu shot   Colonoscopy:  is up to date  Mammogram: NA  Asthma Action Test/Plan:  ACT given today   PHQ9:  PHQ-2 done today   GAD7:  No concerns  Questioned patient about current smoking habits Pt. has never smoked.  Ok to leave detailed message on voice mail for today's visit only Yes, phone # [phone]

## 2021-11-30 NOTE — LETTER
My Asthma Action Plan    Name: Sarthak Berrios   YOB: 1955  Date: 11/30/2021   My doctor: Paul Styles MD   My clinic: [facility]        My Rescue Medicine:   Albuterol inhaler (Proair/Ventolin/Proventil HFA)  2-4 puffs EVERY 4 HOURS as needed. Use a spacer if recommended by your provider.   My Asthma Severity:   Intermittent / Exercise Induced  Know your asthma triggers: Patient is unaware of triggers             GREEN ZONE   Good Control    I feel good    No cough or wheeze    Can work, sleep and play without asthma symptoms       Take your asthma control medicine every day.     1. If exercise triggers your asthma, take your rescue medication    15 minutes before exercise or sports, and    During exercise if you have asthma symptoms  2. Spacer to use with inhaler: If you have a spacer, make sure to use it with your inhaler             YELLOW ZONE Getting Worse  I have ANY of these:    I do not feel good    Cough or wheeze    Chest feels tight    Wake up at night   1. Keep taking your Green Zone medications  2. Start taking your rescue medicine:    every 20 minutes for up to 1 hour. Then every 4 hours for 24-48 hours.  3. If you stay in the Yellow Zone for more than 12-24 hours, contact your doctor.  4. If you do not return to the Green Zone in 12-24 hours or you get worse, start taking your oral steroid medicine if prescribed by your provider.           RED ZONE Medical Alert - Get Help  I have ANY of these:    I feel awful    Medicine is not helping    Breathing getting harder    Trouble walking or talking    Nose opens wide to breathe       1. Take your rescue medicine NOW  2. If your provider has prescribed an oral steroid medicine, start taking it NOW  3. Call your doctor NOW  4. If you are still in the Red Zone after 20 minutes and you have not reached your doctor:    Take your rescue medicine again and    Call 911 or go to the emergency room right away    See your  regular doctor within 2 weeks of an Emergency Room or Urgent Care visit for follow-up treatment.          Annual Reminders:  Meet with Asthma Educator,  Flu Shot in the Fall, consider Pneumonia Vaccination for patients with asthma (aged 19 and older).    Pharmacy: [id number] - [place], [place] - [address] AT [facility] OF [address]    Electronically signed by Paul Styles MD   Date: 11/30/21                    Asthma Triggers  How To Control Things That Make Your Asthma Worse    Triggers are things that make your asthma worse.  Look at the list below to help you find your triggers and   what you can do about them. You can help prevent asthma flare-ups by staying away from your triggers.      Trigger                                                          What you can do   Cigarette Smoke  Tobacco smoke can make asthma worse. Do not allow smoking in your home, car or around you.  Be sure no one smokes at a child s day care or school.  If you smoke, ask your health care provider for ways to help you quit.  Ask family members to quit too.  Ask your health care provider for a referral to Quit Plan to help you quit smoking, or call 3-428-240-PLAN.     Colds, Flu, Bronchitis  These are common triggers of asthma. Wash your hands often.  Don t touch your eyes, nose or mouth.  Get a flu shot every year.     Dust Mites  These are tiny bugs that live in cloth or carpet. They are too small to see. Wash sheets and blankets in hot water every week.   Encase pillows and mattress in dust mite proof covers.  Avoid having carpet if you can. If you have carpet, vacuum weekly.   Use a dust mask and HEPA vacuum.   Pollen and Outdoor Mold  Some people are allergic to trees, grass, or weed pollen, or molds. Try to keep your windows closed.  Limit time out doors when pollen count is high.   Ask you health care provider about taking medicine during allergy season.     Animal Dander  Some people are allergic  to skin flakes, urine or saliva from pets with fur or feathers. Keep pets with fur or feathers out of your home.    If you can t keep the pet outdoors, then keep the pet out of your bedroom.  Keep the bedroom door closed.  Keep pets off cloth furniture and away from stuffed toys.     Mice, Rats, and Cockroaches  Some people are allergic to the waste from these pests.   Cover food and garbage.  Clean up spills and food crumbs.  Store grease in the refrigerator.   Keep food out of the bedroom.   Indoor Mold  This can be a trigger if your home has high moisture. Fix leaking faucets, pipes, or other sources of water.   Clean moldy surfaces.  Dehumidify basement if it is damp and smelly.   Smoke, Strong Odors, and Sprays  These can reduce air quality. Stay away from strong odors and sprays, such as perfume, powder, hair spray, paints, smoke incense, paint, cleaning products, candles and new carpet.   Exercise or Sports  Some people with asthma have this trigger. Be active!  Ask your doctor about taking medicine before sports or exercise to prevent symptoms.    Warm up for 5-10 minutes before and after sports or exercise.     Other Triggers of Asthma  Cold air:  Cover your nose and mouth with a scarf.  Sometimes laughing or crying can be a trigger.  Some medicines and food can trigger asthma.

## 2021-11-30 NOTE — PROGRESS NOTES
3  SUBJECTIVE:   CC: Sarthak Berrios is an 66 year old male who presents for preventive health visit.       Patient has been advised of split billing requirements and indicates understanding: Yes  Healthy Habits:    Do you get at least three servings of calcium containing foods daily (dairy, green leafy vegetables, etc.)? yes    Amount of exercise or daily activities, outside of work: several day(s) per week    Problems taking medications regularly No    Medication side effects: No    Have you had an eye exam in the past two years? yes    Do you see a dentist twice per year? yes    Do you have sleep apnea, excessive snoring or daytime drowsiness?no          Today's PHQ-2 Score:   PHQ-2 ( 1999 Pfizer) 11/30/2021 1/15/2021   Q1: Little interest or pleasure in doing things 0 0   Q2: Feeling down, depressed or hopeless 0 0   PHQ-2 Score 0 0   PHQ-2 Total Score (12-17 Years)- Positive if 3 or more points; Administer PHQ-A if positive - 0       Abuse: Current or Past(Physical, Sexual or Emotional)- No  Do you feel safe in your environment? Yes    Have you ever done Advance Care Planning? (For example, a Health Directive, POLST, or a discussion with a medical provider or your loved ones about your wishes):     Social History     Tobacco Use     Smoking status: Never Smoker     Smokeless tobacco: Never Used   Substance Use Topics     Alcohol use: Yes     Alcohol/week: 2.5 standard drinks     Types: 3 Standard drinks or equivalent per week     Comment: 2-3 times a week     If you drink alcohol do you typically have >3 drinks per day or >7 drinks per week? No                      Last PSA:   [facility] PSA   Date Value Ref Range Status   01/11/2018 2.2 < OR = 4.0 ng/mL Final     Comment:     The total PSA value from this assay system is   standardized against the WHO standard. The test   result will be approximately 20% lower when compared   to the equimolar-standardized total PSA ([place]). Comparison of serial PSA  results should be   interpreted with this fact in mind.     This test was performed using the Siemens   chemiluminescent method. Values obtained from   different assay methods cannot be used  interchangeably. PSA levels, regardless of  value, should not be interpreted as absolute  evidence of the presence or absence of disease.         Reviewed orders with patient. Reviewed health maintenance and updated orders accordingly - Yes  BP Readings from Last 3 Encounters:   11/30/21 (!) 138/90   01/15/21 (!) 150/90   12/17/20 138/86    Wt Readings from Last 3 Encounters:   11/30/21 109.3 kg (241 lb)   12/17/20 108.8 kg (239 lb 12.8 oz)   11/02/20 108.3 kg (238 lb 12.8 oz)                  Patient Active Problem List   Diagnosis     Family history of malignant neoplasm of prostate     Osteoarthritis     FAMILY HX GI MALIGNANCY     ACP (advance care planning)     Health Care Home     Degenerative arthritis of knee     History of total bilateral knee replacement     Mild intermittent asthma without complication     Elevated blood pressure reading without diagnosis of hypertension     Morbid obesity (H)     Past Surgical History:   Procedure Laterality Date     ARTHROPLASTY KNEE Left 11/17/2015    Procedure: ARTHROPLASTY KNEE;  Surgeon: Alfie Ingram MD;  Location: [facility] OR     AS TOTAL KNEE ARTHROPLASTY Right      COLONOSCOPY N/A 1/16/2019    Procedure: COLONOSCOPY;  Surgeon: Paul Carvajal MD;  Location:  GI     HC KNEE SCOPE, DIAGNOSTIC  1999    Arthroscopy, Knee   Right  Dr. Ingram     HC REPAIR UMBILICAL LILLIAM,5+Y/O,REDUC  1996     LIGATN/STRIP LONG OR SHORT SAPHEN  2000     ZZHC COLONOSCOPY THRU STOMA, DIAGNOSTIC  2009       Social History     Tobacco Use     Smoking status: Never Smoker     Smokeless tobacco: Never Used   Substance Use Topics     Alcohol use: Yes     Alcohol/week: 2.5 standard drinks     Types: 3 Standard drinks or equivalent per week     Comment: 2-3 times a week     Family History   Problem  Relation Age of Onset     Alcohol/Drug Father      Cancer Maternal Grandfather         Prostate     Cancer Brother         Bladder     Cardiovascular No family hx of      Diabetes No family hx of      Cancer - colorectal No family hx of      Breast Cancer No family hx of          Current Outpatient Medications   Medication Sig Dispense Refill     multivitamin, therapeutic with minerals (MULTI-VITAMIN) TABS Take 1 tablet by mouth daily       albuterol (VENTOLIN HFA) 108 (90 Base) MCG/ACT inhaler INHALE 2 PUFFS INTO THE LUNGS EVERY 6 HOURS AS NEEDED FOR SHORTNESS OF BREATH OR DIFFICULT BREATHING (Patient not taking: Reported on 11/30/2021) 1 Inhaler 0     ibuprofen (ADVIL/MOTRIN) 600 MG tablet TK 1 T PO Q 6 H WF (Patient not taking: Reported on 11/30/2021)       Allergies   Allergen Reactions     No Known Allergies      Recent Labs   Lab Test 01/11/18  1011 05/04/15  1136 10/29/14  1409   LDL 87  --  78   HDL 42  --  40   TRIG 63  --  102   ALT 79*  --  54*   CR 1.17 0.99 0.99   GFRESTIMATED 67 84 84   POTASSIUM 4.4 4.4 4.1        Reviewed and updated as needed this visit by clinical staff  Tobacco  Allergies  Meds             Reviewed and updated as needed this visit by Provider               Past Medical History:   Diagnosis Date     Arthritis      Family history of malignant neoplasm of prostate     MGF     FAMILY HX GI MALIGNANCY 9/23/2006    MGF had colon cancer     Mild persistent asthma 1/7/2015     Mild persistent asthma 1/7/2015     Other chronic pain     JOint pain for over 10 years     Uncomplicated asthma       Past Surgical History:   Procedure Laterality Date     ARTHROPLASTY KNEE Left 11/17/2015    Procedure: ARTHROPLASTY KNEE;  Surgeon: Alfie Ingram MD;  Location: [facility] OR     AS TOTAL KNEE ARTHROPLASTY Right      COLONOSCOPY N/A 1/16/2019    Procedure: COLONOSCOPY;  Surgeon: Paul Carvajal MD;  Location:  GI     HC KNEE SCOPE, DIAGNOSTIC  1999    Arthroscopy, Knee   Right  Dr. Ingram     HC  "REPAIR UMBILICAL LILLIAM,5+Y/O,REDUC  1996     LIGATN/STRIP LONG OR SHORT SAPHEN  2000     ZZHC COLONOSCOPY THRU STOMA, DIAGNOSTIC  2009       ROS:  CONSTITUTIONAL: NEGATIVE for fever, chills, change in weight  INTEGUMENTARY/SKIN: NEGATIVE for worrisome rashes, moles or lesions  EYES: NEGATIVE for vision changes or irritation  ENT: NEGATIVE for ear, mouth and throat problems  RESP: NEGATIVE for significant cough or SOB  CV: NEGATIVE for chest pain, palpitations or peripheral edema  GI: NEGATIVE for nausea, abdominal pain, heartburn, or change in bowel habits   male: negative for dysuria, hematuria, decreased urinary stream, erectile dysfunction, urethral discharge  MUSCULOSKELETAL: NEGATIVE for significant arthralgias or myalgia  NEURO: NEGATIVE for weakness, dizziness or paresthesias  ENDOCRINE: NEGATIVE for temperature intolerance, skin/hair changes  PSYCHIATRIC: NEGATIVE for changes in mood or affect    OBJECTIVE:   BP (!) 138/90 (BP Location: Right arm, Patient Position: Sitting, Cuff Size: Adult Large)   Pulse 82   Temp 97.9  F (36.6  C) (Temporal)   Resp 22   Ht 1.765 m (5' 9.5\")   Wt 109.3 kg (241 lb)   SpO2 96%   BMI 35.08 kg/m    EXAM:  GENERAL: healthy, alert and no distress  EYES: Eyes grossly normal to inspection, PERRL and conjunctivae and sclerae normal  HENT: ear canals and TM's normal, nose and mouth without ulcers or lesions  NECK: no adenopathy, no asymmetry, masses, or scars and thyroid normal to palpation  RESP: lungs clear to auscultation - no rales, rhonchi or wheezes  CV: regular rate and rhythm, normal S1 S2, no S3 or S4, no murmur, click or rub, no peripheral edema and peripheral pulses strong  ABDOMEN: soft, nontender, no hepatosplenomegaly, no masses and bowel sounds normal   (male): normal male genitalia without lesions or urethral discharge, no hernia  RECTAL (male): deferred  MS: no gross musculoskeletal defects noted, no edema  SKIN: no suspicious lesions or rashes  NEURO: " "Normal strength and tone, mentation intact and speech normal  PSYCH: mentation appears normal, affect normal/bright    Diagnostic Test Results:  Labs reviewed in Epic    ASSESSMENT/PLAN:   (Z00.00) Routine general medical examination at a health care facility  (primary encounter diagnosis)  Comment: discussed preventitive healthcare   Plan: Continue to work on healthy diet and exercise, discussed healthy habits     (E66.01) Morbid obesity (H)- BMI over 35  Comment:   Plan: Continue to work on healthy diet and exercise, discussed healthy habits     (R03.0) Elevated blood pressure reading without diagnosis of hypertension  Comment: borderline  Plan: Lipid Panel (BFP), Comprehensive Metobolic         Panel (BFP), VENOUS COLLECTION        Check blood pressure readings outside of the clinic several times per week, write down values, and follow up if elevated within the next several weeks. Blood pressure can be checked at the firestation, drugstore,  or any valid site.     (J45.20) Mild intermittent asthma without complication  Comment: rare use albuterol  Plan: Asthma Action Plan (AAP)            (Z12.5) Special screening for malignant neoplasm of prostate  Comment:   Plan: PSA Total ([facility])              Patient has been advised of split billing requirements and indicates understanding: Yes  COUNSELING:  Reviewed preventive health counseling, as reflected in patient instructions       Regular exercise       Healthy diet/nutrition       Vision screening       Hearing screening       Immunizations    Vaccinated for: Influenza             Colon cancer screening       Prostate cancer screening    Estimated body mass index is 35.08 kg/m  as calculated from the following:    Height as of this encounter: 1.765 m (5' 9.5\").    Weight as of this encounter: 109.3 kg (241 lb).    Weight management plan: Discussed healthy diet and exercise guidelines    He reports that he has never smoked. He has never used smokeless " tobacco.      Counseling Resources:  ATP IV Guidelines  Pooled Cohorts Equation Calculator  FRAX Risk Assessment  ICSI Preventive Guidelines  Dietary Guidelines for Americans, 2010  Fingo's MyPlate  ASA Prophylaxis  Lung CA Screening    Paul Styles MD  [facility]

## 2021-11-30 NOTE — LETTER
My Asthma Action Plan    Name: Sarthak Berrios   YOB: 1955  Date: 11/30/2021   My doctor: Paul Styles MD   My clinic: [facility]        My Rescue Medicine:   Albuterol inhaler (Proair/Ventolin/Proventil HFA)  2-4 puffs EVERY 4 HOURS as needed. Use a spacer if recommended by your provider.   My Asthma Severity:   Intermittent / Exercise Induced  Know your asthma triggers: exercise or sports             GREEN ZONE   Good Control    I feel good    No cough or wheeze    Can work, sleep and play without asthma symptoms       Take your asthma control medicine every day.     1. If exercise triggers your asthma, take your rescue medication    15 minutes before exercise or sports, and    During exercise if you have asthma symptoms  2. Spacer to use with inhaler: If you have a spacer, make sure to use it with your inhaler             YELLOW ZONE Getting Worse  I have ANY of these:    I do not feel good    Cough or wheeze    Chest feels tight    Wake up at night   1. Keep taking your Green Zone medications  2. Start taking your rescue medicine:    every 20 minutes for up to 1 hour. Then every 4 hours for 24-48 hours.  3. If you stay in the Yellow Zone for more than 12-24 hours, contact your doctor.  4. If you do not return to the Green Zone in 12-24 hours or you get worse, start taking your oral steroid medicine if prescribed by your provider.           RED ZONE Medical Alert - Get Help  I have ANY of these:    I feel awful    Medicine is not helping    Breathing getting harder    Trouble walking or talking    Nose opens wide to breathe       1. Take your rescue medicine NOW  2. If your provider has prescribed an oral steroid medicine, start taking it NOW  3. Call your doctor NOW  4. If you are still in the Red Zone after 20 minutes and you have not reached your doctor:    Take your rescue medicine again and    Call 911 or go to the emergency room right away    See your regular  doctor within 2 weeks of an Emergency Room or Urgent Care visit for follow-up treatment.          Annual Reminders:  Meet with Asthma Educator,  Flu Shot in the Fall, consider Pneumonia Vaccination for patients with asthma (aged 19 and older).    Pharmacy: [id number] - [place], [place] - [phone] AT [facility] [address] [facility] [address]    Electronically signed by Paul Styles MD   Date: 11/30/21                    Asthma Triggers  How To Control Things That Make Your Asthma Worse    Triggers are things that make your asthma worse.  Look at the list below to help you find your triggers and   what you can do about them. You can help prevent asthma flare-ups by staying away from your triggers.      Trigger                                                          What you can do   Cigarette Smoke  Tobacco smoke can make asthma worse. Do not allow smoking in your home, car or around you.  Be sure no one smokes at a child s day care or school.  If you smoke, ask your health care provider for ways to help you quit.  Ask family members to quit too.  Ask your health care provider for a referral to Quit Plan to help you quit smoking, or call 0-346-711-PLAN.     Colds, Flu, Bronchitis  These are common triggers of asthma. Wash your hands often.  Don t touch your eyes, nose or mouth.  Get a flu shot every year.     Dust Mites  These are tiny bugs that live in cloth or carpet. They are too small to see. Wash sheets and blankets in hot water every week.   Encase pillows and mattress in dust mite proof covers.  Avoid having carpet if you can. If you have carpet, vacuum weekly.   Use a dust mask and HEPA vacuum.   Pollen and Outdoor Mold  Some people are allergic to trees, grass, or weed pollen, or molds. Try to keep your windows closed.  Limit time out doors when pollen count is high.   Ask you health care provider about taking medicine during allergy season.     Animal Dander  Some people are allergic to skin  flakes, urine or saliva from pets with fur or feathers. Keep pets with fur or feathers out of your home.    If you can t keep the pet outdoors, then keep the pet out of your bedroom.  Keep the bedroom door closed.  Keep pets off cloth furniture and away from stuffed toys.     Mice, Rats, and Cockroaches  Some people are allergic to the waste from these pests.   Cover food and garbage.  Clean up spills and food crumbs.  Store grease in the refrigerator.   Keep food out of the bedroom.   Indoor Mold  This can be a trigger if your home has high moisture. Fix leaking faucets, pipes, or other sources of water.   Clean moldy surfaces.  Dehumidify basement if it is damp and smelly.   Smoke, Strong Odors, and Sprays  These can reduce air quality. Stay away from strong odors and sprays, such as perfume, powder, hair spray, paints, smoke incense, paint, cleaning products, candles and new carpet.   Exercise or Sports  Some people with asthma have this trigger. Be active!  Ask your doctor about taking medicine before sports or exercise to prevent symptoms.    Warm up for 5-10 minutes before and after sports or exercise.     Other Triggers of Asthma  Cold air:  Cover your nose and mouth with a scarf.  Sometimes laughing or crying can be a trigger.  Some medicines and food can trigger asthma.

## 2021-12-01 LAB — ABBOTT PSA - QUEST: 2.94 NG/ML

## 2021-12-01 ASSESSMENT — ASTHMA QUESTIONNAIRES: ACT_TOTALSCORE: 25

## 2021-12-02 DIAGNOSIS — M54.50 BILATERAL LOW BACK PAIN WITHOUT SCIATICA, UNSPECIFIED CHRONICITY: Primary | ICD-10-CM

## 2021-12-02 RX ORDER — CYCLOBENZAPRINE HCL 10 MG
10 TABLET ORAL DAILY PRN
Qty: 20 TABLET | Refills: 0 | Status: SHIPPED | OUTPATIENT
Start: 2021-12-02 | End: 2022-02-08

## 2021-12-02 NOTE — TELEPHONE ENCOUNTER
Pt called stating he was just in for his CPX a few days ago. He forgot to ask for a refill of flexeril 10MG. He stated this was prescribed in 2019 for back pain, he has made 20 tablets last almost 2 years. He is wondering if he can get a small supply sent in to use PRN for low back pain. Please advise.    Sarthak Berrios is requesting a refill of:    Pending Prescriptions:                       Disp   Refills    cyclobenzaprine (FLEXERIL) 10 MG tablet   20 tab*0            Sig: Take 1 tablet (10 mg) by mouth daily as needed           for muscle spasms

## 2021-12-03 ENCOUNTER — DOCUMENTATION ONLY (OUTPATIENT)
Dept: OTHER | Facility: CLINIC | Age: 66
End: 2021-12-03
Payer: COMMERCIAL

## 2021-12-06 ENCOUNTER — TRANSFERRED RECORDS (OUTPATIENT)
Dept: FAMILY MEDICINE | Facility: CLINIC | Age: 66
End: 2021-12-06

## 2021-12-21 ENCOUNTER — ALLIED HEALTH/NURSE VISIT (OUTPATIENT)
Dept: FAMILY MEDICINE | Facility: CLINIC | Age: 66
End: 2021-12-21

## 2021-12-21 DIAGNOSIS — Z23 NEED FOR VACCINATION: Primary | ICD-10-CM

## 2021-12-21 PROCEDURE — 90732 PPSV23 VACC 2 YRS+ SUBQ/IM: CPT | Performed by: FAMILY MEDICINE

## 2021-12-21 PROCEDURE — 90471 IMMUNIZATION ADMIN: CPT | Performed by: FAMILY MEDICINE

## 2022-01-01 NOTE — TELEPHONE ENCOUNTER
"  [facility] Triage Call  Presenting Problem:Lane states yesterday, he was putting his socks on when he felt a \"Twinge\" in his mid lower back. 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     Guideline Used:Back Pain.     Patient Recommendations/Teaching :See Ed Immediately. Patient declined ED recommendation or to call for transport.     Reason for Disposition    [1] SEVERE back pain (e.g., excruciating) AND [2] sudden onset AND [3] age > 60    Additional Information    Negative: Passed out (i.e., lost consciousness, collapsed and was not responding)    Negative: Shock suspected (e.g., cold/pale/clammy skin, too weak to stand, low BP, rapid pulse)    Negative: Sounds like a life-threatening emergency to the triager    Negative: Major injury to the back (e.g., MVA, fall > 10 feet or 3 meters, penetrating injury, etc.)    Negative: Followed a tailbone injury    Negative: [1] Pain in the upper back over the ribs (rib cage) AND [2] radiates (travels, goes) into chest    Negative: [1] Pain in the upper back over the ribs (rib cage) AND [2] worsened by coughing (or clearly increases with breathing)    Negative: Back pain during pregnancy    Negative: Pain mainly in flank (i.e., in the side, over the lower ribs or just below the ribs)    Protocols used: BACK PAIN-ADULT-    "
Statement Selected

## 2022-02-08 ENCOUNTER — OFFICE VISIT (OUTPATIENT)
Dept: FAMILY MEDICINE | Facility: CLINIC | Age: 67
End: 2022-02-08

## 2022-02-08 VITALS — SYSTOLIC BLOOD PRESSURE: 152 MMHG | TEMPERATURE: 98.7 F | DIASTOLIC BLOOD PRESSURE: 84 MMHG

## 2022-02-08 DIAGNOSIS — J45.30 MILD PERSISTENT ASTHMA WITHOUT COMPLICATION: Primary | ICD-10-CM

## 2022-02-08 DIAGNOSIS — R03.0 ELEVATED BLOOD PRESSURE READING WITHOUT DIAGNOSIS OF HYPERTENSION: ICD-10-CM

## 2022-02-08 DIAGNOSIS — R21 RASH AND NONSPECIFIC SKIN ERUPTION: ICD-10-CM

## 2022-02-08 DIAGNOSIS — L03.012 PARONYCHIA OF FINGER, LEFT: ICD-10-CM

## 2022-02-08 PROCEDURE — 99213 OFFICE O/P EST LOW 20 MIN: CPT | Performed by: FAMILY MEDICINE

## 2022-02-08 RX ORDER — ALBUTEROL SULFATE 90 UG/1
AEROSOL, METERED RESPIRATORY (INHALATION)
Qty: 18 G | Refills: 0 | Status: SHIPPED | OUTPATIENT
Start: 2022-02-08 | End: 2022-10-27

## 2022-02-08 NOTE — PROGRESS NOTES
SUBJECTIVE:  Sarthak Berrios is a 66 year old male who presents complaining of left third finger pain.  Pt. has noted some redness and swelling along the cuticle margin.  Symptoms began 2 months ago.  He punctured small pocket x 2 and it seems to have resolved-not sure if needs any further treatment.   Severity: mild.  Pt. denies any trauma to the area.  No fevers or chills noted.  No migration of redness or swelling proximally.    Pt also notes small bump right upper arm for a week-itchy    Pt did also have elevated BP last check-has not checked since\\       Past Medical History:   Diagnosis Date     Arthritis      Family history of malignant neoplasm of prostate     MGF     FAMILY HX GI MALIGNANCY 9/23/2006    MGF had colon cancer     Mild persistent asthma 1/7/2015     Mild persistent asthma 1/7/2015     Other chronic pain     JOint pain for over 10 years     Uncomplicated asthma        Past Surgical History:   Procedure Laterality Date     ARTHROPLASTY KNEE Left 11/17/2015    Procedure: ARTHROPLASTY KNEE;  Surgeon: Alfie Ingram MD;  Location: [facility] OR     AS TOTAL KNEE ARTHROPLASTY Right      COLONOSCOPY N/A 1/16/2019    Procedure: COLONOSCOPY;  Surgeon: Paul Carvajal MD;  Location:  GI     HC KNEE SCOPE, DIAGNOSTIC  1999    Arthroscopy, Knee   Right  Dr. Ingram     HC REPAIR UMBILICAL LILLIAM,5+Y/O,REDUC  1996     LIGATN/STRIP LONG OR SHORT SAPHEN  2000     ZZHC COLONOSCOPY THRU STOMA, DIAGNOSTIC  2009         Current Outpatient Medications   Medication Sig Dispense Refill     multivitamin, therapeutic with minerals (MULTI-VITAMIN) TABS Take 1 tablet by mouth daily       albuterol (VENTOLIN HFA) 108 (90 Base) MCG/ACT inhaler INHALE 2 PUFFS INTO THE LUNGS EVERY 6 HOURS AS NEEDED FOR SHORTNESS OF BREATH OR DIFFICULT BREATHING (Patient not taking: Reported on 11/30/2021) 1 Inhaler 0           ROS:  Review of Systems  ROS:  CONSTITUTIONAL: NEGATIVE for fever, chills  EYES: NEGATIVE for vision changes   RESP:  NEGATIVE for significant cough or SOB  CV: NEGATIVE for chest pain, palpitations   GI: NEGATIVE for nausea, abdominal pain, heartburn, or change in bowel habits  : NEGATIVE for frequency, dysuria, or hematuria  MUSCULOSKELETAL: NEGATIVE for significant arthralgias or myalgia  NEURO: NEGATIVE for weakness, dizziness or paresthesias or headache     OBJECTIVE:  Temp 98.7  F (37.1  C) (Temporal)   Hand exam:  examination of third finger reveals paronychia with minimal redness, no current tenderness or swelling along distal finger.  Nail is ridged but no others involved    Right upper arm with small papule with crust    ASSESSMENT:   (J45.30) Mild persistent asthma without complication  (primary encounter diagnosis)  Comment: stable  Plan: albuterol (VENTOLIN HFA) 108 (90 Base) MCG/ACT         inhaler            (L03.012) Paronychia of finger, left  Comment: discussed n current infection, observe, consider derm eval if persists as may need nail avulsion -partial  Plan: f/u if not improving or worsening     (R21) Rash and nonspecific skin eruption  Comment: likely benign, present one week, can try hydrocortisone cream  Plan: f/u if not improving or worsening with derm    (R03.0) Elevated blood pressure reading without diagnosis of hypertension  Comment: Check blood pressure readings outside of the clinic several times per week, write down values, and follow up if elevated within the next several weeks. Blood pressure can be checked at the firestation, drugstore,  or any valid site.   Plan:

## 2022-02-08 NOTE — NURSING NOTE
Sarthak Berrios is here for a possible infection on his left hand, middle finger and spot on right arm.   He declined his vitals as he was charged more from his CPX due to high BP.    Questioned patient about current smoking habits.  Pt. has never smoked.  PULSE regular  My Chart: active  CLASSIFICATION OF OVERWEIGHT AND OBESITY BY BMI                        Obesity Class           BMI(kg/m2)  Underweight                                    < 18.5  Normal                                         18.5-24.9  Overweight                                     25.0-29.9  OBESITY                     I                  30.0-34.9                             II                 35.0-39.9  EXTREME OBESITY             III                >40                            Patient's  BMI There is no height or weight on file to calculate BMI.  http://hin.nhlbi.nih.gov/menuplanner/menu.cgi  Pre-visit planning  Immunizations - up to date  Colonoscopy - is up to date  Mammogram -   Asthma -   PHQ9 -    TIM-7 -    Hearing Test -

## 2022-07-11 ENCOUNTER — TRANSFERRED RECORDS (OUTPATIENT)
Dept: FAMILY MEDICINE | Facility: CLINIC | Age: 67
End: 2022-07-11

## 2022-07-27 ENCOUNTER — TRANSFERRED RECORDS (OUTPATIENT)
Dept: FAMILY MEDICINE | Facility: CLINIC | Age: 67
End: 2022-07-27

## 2022-09-23 ENCOUNTER — ALLIED HEALTH/NURSE VISIT (OUTPATIENT)
Dept: FAMILY MEDICINE | Facility: CLINIC | Age: 67
End: 2022-09-23

## 2022-09-23 DIAGNOSIS — Z23 NEED FOR VACCINATION: Primary | ICD-10-CM

## 2022-09-23 PROCEDURE — 90662 IIV NO PRSV INCREASED AG IM: CPT | Performed by: FAMILY MEDICINE

## 2022-09-23 PROCEDURE — 90471 IMMUNIZATION ADMIN: CPT | Performed by: FAMILY MEDICINE

## 2022-10-27 DIAGNOSIS — J45.30 MILD PERSISTENT ASTHMA WITHOUT COMPLICATION: ICD-10-CM

## 2022-10-27 RX ORDER — ALBUTEROL SULFATE 90 UG/1
AEROSOL, METERED RESPIRATORY (INHALATION)
Qty: 18 G | Refills: 0 | COMMUNITY
Start: 2022-10-27

## 2022-10-27 RX ORDER — ALBUTEROL SULFATE 90 UG/1
AEROSOL, METERED RESPIRATORY (INHALATION)
Qty: 18 G | Refills: 0 | Status: SHIPPED | OUTPATIENT
Start: 2022-10-27 | End: 2022-11-09

## 2022-10-27 NOTE — TELEPHONE ENCOUNTER
Pt left a voicemail requesting this refill as well.  I called back and left a detailed voicemail stating to call back with updated bp's as he was supposed to follow up a few weeks later after his appt in Feb 2022.      Routing this refill request to Dr. Styles, thanks.      Pending Prescriptions:                       Disp   Refills    albuterol (VENTOLIN HFA) 108 (90 Base) MC*18 g   0            Sig: INHALE 2 PUFFS INTO THE LUNGS EVERY 6 HOURS AS           NEEDED FOR SHORTNESS OF BREATH OR DIFFICULT           BREATHING  Refused Prescriptions:                       Disp   Refills    albuterol (VENTOLIN HFA) 108 (90 Base) MCG*18 g   0        Sig: INHALE 2 PUFFS INTO THE LUNGS EVERY 6 HOURS AS NEEDED           FOR SHORTNESS OF BREATH OR DIFFICULT BREATHING  Refused By: TANISHA VILLAR  Reason for Refusal: Patient should contact provider first

## 2022-10-27 NOTE — TELEPHONE ENCOUNTER
His bp today was 150/93.  He is also requesting a refill for a daily inhaler.  He had Flovent HFA a in 2019.    Please advise, thanks.

## 2022-10-27 NOTE — TELEPHONE ENCOUNTER
Sarthak Berrios is requesting a refill of:    Refused Prescriptions:                       Disp   Refills    albuterol (VENTOLIN HFA) 108 (90 Base) MCG*18 g   0        Sig: INHALE 2 PUFFS INTO THE LUNGS EVERY 6 HOURS AS NEEDED           FOR SHORTNESS OF BREATH OR DIFFICULT BREATHING  Refused By: TANISHA VILLAR  Reason for Refusal: Patient should contact provider first    Sent [employer] message

## 2022-10-27 NOTE — TELEPHONE ENCOUNTER
Rx sent  Needs to follow up in office if BP running high as we need to now discuss medications and start treatment

## 2022-11-09 ENCOUNTER — OFFICE VISIT (OUTPATIENT)
Dept: FAMILY MEDICINE | Facility: CLINIC | Age: 67
End: 2022-11-09

## 2022-11-09 VITALS
HEART RATE: 80 BPM | SYSTOLIC BLOOD PRESSURE: 134 MMHG | DIASTOLIC BLOOD PRESSURE: 84 MMHG | RESPIRATION RATE: 20 BRPM | BODY MASS INDEX: 35.1 KG/M2 | TEMPERATURE: 97.1 F | WEIGHT: 245.2 LBS | HEIGHT: 70 IN

## 2022-11-09 DIAGNOSIS — E66.01 MORBID OBESITY (H): ICD-10-CM

## 2022-11-09 DIAGNOSIS — J45.30 MILD PERSISTENT ASTHMA WITHOUT COMPLICATION: ICD-10-CM

## 2022-11-09 DIAGNOSIS — R03.0 ELEVATED BLOOD PRESSURE READING WITHOUT DIAGNOSIS OF HYPERTENSION: Primary | ICD-10-CM

## 2022-11-09 PROCEDURE — 99214 OFFICE O/P EST MOD 30 MIN: CPT | Performed by: FAMILY MEDICINE

## 2022-11-09 RX ORDER — ALBUTEROL SULFATE 1.25 MG/3ML
1.25 SOLUTION RESPIRATORY (INHALATION) 3 TIMES DAILY PRN
Qty: 3 ML | Refills: 3 | Status: SHIPPED | OUTPATIENT
Start: 2022-11-09

## 2022-11-09 RX ORDER — DEXAMETHASONE 4 MG/1
2 TABLET ORAL 2 TIMES DAILY
Qty: 12 G | Refills: 1 | Status: SHIPPED | OUTPATIENT
Start: 2022-11-09 | End: 2024-03-21

## 2022-11-09 RX ORDER — FLUTICASONE PROPIONATE 110 UG/1
2 AEROSOL, METERED RESPIRATORY (INHALATION) 2 TIMES DAILY
Qty: 12 G | Refills: 1 | Status: SHIPPED | OUTPATIENT
Start: 2022-11-09 | End: 2022-11-09

## 2022-11-09 ASSESSMENT — ASTHMA QUESTIONNAIRES: ACT_TOTALSCORE: 17

## 2022-11-09 NOTE — LETTER
My Asthma Action Plan    Name: Sarthak Berrios   YOB: 1955  Date: 11/9/2022   My doctor: Paul Styles MD   My clinic: [facility]        My Rescue Medicine:   Albuterol inhaler (Proair/Ventolin/Proventil HFA)  2-4 puffs EVERY 4 HOURS as needed. Use a spacer if recommended by your provider.   My Asthma Severity:   Intermittent / Exercise Induced  Know your asthma triggers: Patient is unaware of triggers             GREEN ZONE   Good Control    I feel good    No cough or wheeze    Can work, sleep and play without asthma symptoms       Take your asthma control medicine every day.     1. If exercise triggers your asthma, take your rescue medication    15 minutes before exercise or sports, and    During exercise if you have asthma symptoms  2. Spacer to use with inhaler: If you have a spacer, make sure to use it with your inhaler             YELLOW ZONE Getting Worse  I have ANY of these:    I do not feel good    Cough or wheeze    Chest feels tight    Wake up at night   1. Keep taking your Green Zone medications  2. Start taking your rescue medicine:    every 20 minutes for up to 1 hour. Then every 4 hours for 24-48 hours.  3. If you stay in the Yellow Zone for more than 12-24 hours, contact your doctor.  4. If you do not return to the Green Zone in 12-24 hours or you get worse, start taking your oral steroid medicine if prescribed by your provider.           RED ZONE Medical Alert - Get Help  I have ANY of these:    I feel awful    Medicine is not helping    Breathing getting harder    Trouble walking or talking    Nose opens wide to breathe       1. Take your rescue medicine NOW  2. If your provider has prescribed an oral steroid medicine, start taking it NOW  3. Call your doctor NOW  4. If you are still in the Red Zone after 20 minutes and you have not reached your doctor:    Take your rescue medicine again and    Call 911 or go to the emergency room right away    See your  regular doctor within 2 weeks of an Emergency Room or Urgent Care visit for follow-up treatment.          Annual Reminders:  Meet with Asthma Educator,  Flu Shot in the Fall, consider Pneumonia Vaccination for patients with asthma (aged 19 and older).    Pharmacy: [id number] - [place], [place] - [phone] [facility] [address] [facility] [address]    Electronically signed by Paul Styles MD   Date: 11/09/22                    Asthma Triggers  How To Control Things That Make Your Asthma Worse    Triggers are things that make your asthma worse.  Look at the list below to help you find your triggers and   what you can do about them. You can help prevent asthma flare-ups by staying away from your triggers.      Trigger                                                          What you can do   Cigarette Smoke  Tobacco smoke can make asthma worse. Do not allow smoking in your home, car or around you.  Be sure no one smokes at a child s day care or school.  If you smoke, ask your health care provider for ways to help you quit.  Ask family members to quit too.  Ask your health care provider for a referral to Quit Plan to help you quit smoking, or call 8-514-643-PLAN.     Colds, Flu, Bronchitis  These are common triggers of asthma. Wash your hands often.  Don t touch your eyes, nose or mouth.  Get a flu shot every year.     Dust Mites  These are tiny bugs that live in cloth or carpet. They are too small to see. Wash sheets and blankets in hot water every week.   Encase pillows and mattress in dust mite proof covers.  Avoid having carpet if you can. If you have carpet, vacuum weekly.   Use a dust mask and HEPA vacuum.   Pollen and Outdoor Mold  Some people are allergic to trees, grass, or weed pollen, or molds. Try to keep your windows closed.  Limit time out doors when pollen count is high.   Ask you health care provider about taking medicine during allergy season.     Animal Dander  Some people are allergic  to skin flakes, urine or saliva from pets with fur or feathers. Keep pets with fur or feathers out of your home.    If you can t keep the pet outdoors, then keep the pet out of your bedroom.  Keep the bedroom door closed.  Keep pets off cloth furniture and away from stuffed toys.     Mice, Rats, and Cockroaches  Some people are allergic to the waste from these pests.   Cover food and garbage.  Clean up spills and food crumbs.  Store grease in the refrigerator.   Keep food out of the bedroom.   Indoor Mold  This can be a trigger if your home has high moisture. Fix leaking faucets, pipes, or other sources of water.   Clean moldy surfaces.  Dehumidify basement if it is damp and smelly.   Smoke, Strong Odors, and Sprays  These can reduce air quality. Stay away from strong odors and sprays, such as perfume, powder, hair spray, paints, smoke incense, paint, cleaning products, candles and new carpet.   Exercise or Sports  Some people with asthma have this trigger. Be active!  Ask your doctor about taking medicine before sports or exercise to prevent symptoms.    Warm up for 5-10 minutes before and after sports or exercise.     Other Triggers of Asthma  Cold air:  Cover your nose and mouth with a scarf.  Sometimes laughing or crying can be a trigger.  Some medicines and food can trigger asthma.

## 2022-11-09 NOTE — PROGRESS NOTES
"  Assessment & Plan     Elevated blood pressure reading without diagnosis of hypertension  Discussed criteria for BP medication-pt is on the edge today, discussed option, he prefers to monitor for now, Check blood pressure readings outside of the clinic several times per week, write down values, and follow up if elevated within the next several weeks. Blood pressure can be checked at the firestation, drugstore,  or any valid site.     Mild persistent asthma without complication  suboptimal control, will refill rescue meds and also get him back on flovent, f/u if not improving or worsening   - Nebulizer and Supplies Order for DME - ONLY FOR DME  - albuterol (ACCUNEB) 1.25 MG/3ML neb solution  Dispense: 3 mL; Refill: 3  - Asthma Action Plan (AAP)  - fluticasone (FLOVENT HFA) 110 MCG/ACT inhaler  Dispense: 12 g; Refill: 1    Morbid obesity (H)  Continue to work on healthy diet and exercise, discussed healthy habits       Review of the result(s) of each unique test - ACT  Ordering of each unique test  Prescription drug management         BMI:   Estimated body mass index is 35.69 kg/m  as calculated from the following:    Height as of this encounter: 1.765 m (5' 9.5\").    Weight as of this encounter: 111.2 kg (245 lb 3.2 oz).   Weight management plan: Discussed healthy diet and exercise guidelines    FUTURE APPOINTMENTS:       - Follow-up visit in 6 mo  Work on weight loss  Regular exercise    No follow-ups on file.    Paul Styles MD  [facility]    Subjective   Sarthak is a 67 year old, presenting for the following health issues:  Recheck Medication      HPI     PreHypertension Follow-up      Do you check your blood pressure regularly outside of the clinic? Yes  140-150/80 at home    Are you following a low salt diet? No    Are your blood pressures ever more than 140 on the top number (systolic) OR more   than 90 on the bottom number (diastolic), for example 140/90? Yes    Asthma " "Follow-Up    Was ACT completed today?    Yes    ACT Total Scores 11/30/2021   ACT TOTAL SCORE -   ASTHMA ER VISITS -   ASTHMA HOSPITALIZATIONS -   ACT TOTAL SCORE (Goal Greater than or Equal to 20) 25   In the past 12 months, how many times did you visit the emergency room for your asthma without being admitted to the hospital? 0   In the past 12 months, how many times were you hospitalized overnight because of your asthma? 0          How many days per week do you miss taking your asthma controller medication?  I do not have an asthma controller medication    Please describe any recent triggers for your asthma: Patient is unaware of triggers    Have you had any Emergency Room Visits, Urgent Care Visits, or Hospital Admissions since your last office visit?  No      How many servings of fruits and vegetables do you eat daily?  2-3    On average, how many sweetened beverages do you drink each day (Examples: soda, juice, sweet tea, etc.  Do NOT count diet or artificially sweetened beverages)?   1    How many days per week do you exercise enough to make your heart beat faster? 10k steps daily    How many minutes a day do you exercise enough to make your heart beat faster?     How many days per week do you miss taking your medication? 0        Review of Systems   Constitutional, HEENT, cardiovascular, pulmonary, gi and gu systems are negative, except as otherwise noted.      Objective    /84 (BP Location: Left arm, Patient Position: Chair, Cuff Size: Adult Large)   Pulse 80   Temp 97.1  F (36.2  C) (Temporal)   Resp 20   Ht 1.765 m (5' 9.5\")   Wt 111.2 kg (245 lb 3.2 oz)   BMI 35.69 kg/m    Body mass index is 35.69 kg/m .  Physical Exam   GENERAL: healthy, alert and no distress  NECK: no adenopathy, no asymmetry, masses, or scars and thyroid normal to palpation  RESP: lungs clear to auscultation - no rales, rhonchi or wheezes  CV: regular rate and rhythm, normal S1 S2, no S3 or S4, no murmur, click or rub, no " peripheral edema and peripheral pulses strong  ABDOMEN: soft, nontender, no hepatosplenomegaly, no masses and bowel sounds normal  MS: no gross musculoskeletal defects noted, no edema    Office Visit on 11/30/2021   Component Date Value Ref Range Status     Cholesterol 11/30/2021 200 (A)  0 - 199 mg/dL Final     Triglycerides 11/30/2021 116  0 - 149 mg/dL Final     HDL Cholesterol 11/30/2021 57  40 - 150 mg/dL Final     LDL Cholesterol Direct 11/30/2021 120  0 - 130 mg/dL Final     Cholesterol/HDL Ratio 11/30/2021 4  0 - 5 Final     Carbon Dioxide 11/30/2021 30.8  20 - 32 mmol/L Final     Creatinine 11/30/2021 1.03  0.60 - 1.30 mg/dL Final     Glucose 11/30/2021 97  60 - 99 mg/dL Final     Sodium 11/30/2021 142.7  135 - 146 mmol/L Final     Potassium 11/30/2021 4.95  3.5 - 5.3 mmol/L Final     Chloride 11/30/2021 105.5  98 - 110 mmol/L Final     Protein Total 11/30/2021 7.0  6.1 - 8.1 g/dL Final     Albumin 11/30/2021 4.6  3.6 - 5.1 g/dL Final     Alkaline Phosphatase 11/30/2021 50  33 - 130 U/L Final     ALT 11/30/2021 60 (A)  0 - 32 U/L Final     AST 11/30/2021 31  0 - 35 U/L Final     Bilirubin Total 11/30/2021 1.6 (A)  0.2 - 1.2 mg/dL Final     Urea Nitrogen 11/30/2021 13  7 - 25 mg/dL Final     Calcium 11/30/2021 9.9  8.6 - 10.3 mg/dL Final     BUN/Creatinine Ratio 11/30/2021 12.6  6 - 22 Final     Globulin Calculated 11/30/2021 2.4  1.9 - 3.7 Final     A/G Ratio 11/30/2021 1.9  1 - 2.5 Final     Abbott PSA 11/30/2021 2.94  < OR = 4.00 ng/mL Final    Comment: The total PSA value from this assay system is   standardized against the WHO standard. The test   result will be approximately 20% lower when compared   to the equimolar-standardized total PSA (Estefanía   Ade). Comparison of serial PSA results should be   interpreted with this fact in mind.     This test was performed using the Siemens   chemiluminescent method. Values obtained from   different assay methods cannot be used  interchangeably. PSA levels,  regardless of  value, should not be interpreted as absolute  evidence of the presence or absence of disease.

## 2022-11-09 NOTE — NURSING NOTE
Sarthak Berrios is here for a refill of his inhaler, Flovent and also needs a new nebulizer    Questioned patient about current smoking habits.  Pt. has never smoked.  PULSE regular  My Chart: active  CLASSIFICATION OF OVERWEIGHT AND OBESITY BY BMI                        Obesity Class           BMI(kg/m2)  Underweight                                    < 18.5  Normal                                         18.5-24.9  Overweight                                     25.0-29.9  OBESITY                     I                  30.0-34.9                             II                 35.0-39.9  EXTREME OBESITY             III                >40                            Patient's  BMI Body mass index is 35.69 kg/m .  http://hin.nhlbi.nih.gov/menuplanner/menu.cgi  Pre-visit planning  Immunizations - up to date  Colonoscopy -   Mammogram -   Asthma - done  PHQ9 -    TIM-7 -      The patient has verbalized that it is ok to leave a detailed voice message on the patient's cell phone with results/recommendations from this visit.

## 2022-12-20 DIAGNOSIS — J45.30 MILD PERSISTENT ASTHMA WITHOUT COMPLICATION: ICD-10-CM

## 2022-12-21 RX ORDER — ALBUTEROL SULFATE 90 UG/1
AEROSOL, METERED RESPIRATORY (INHALATION)
Qty: 18 G | Refills: 0 | Status: SHIPPED | OUTPATIENT
Start: 2022-12-21 | End: 2024-03-21

## 2022-12-21 NOTE — TELEPHONE ENCOUNTER
Sarthak Berrios is requesting a refill of:    Pending Prescriptions:                       Disp   Refills    albuterol (VENTOLIN HFA) 108 (90 Base) MC*18 g   0            Sig: INHALE 2 PUFFS INTO THE LUNGS EVERY 6 HOURS AS           NEEDED FOR SHORTNESS OF BREATH OR DIFFICULT           BREATHING

## 2023-04-01 ENCOUNTER — HEALTH MAINTENANCE LETTER (OUTPATIENT)
Age: 68
End: 2023-04-01

## 2023-06-01 ENCOUNTER — OFFICE VISIT (OUTPATIENT)
Dept: FAMILY MEDICINE | Facility: CLINIC | Age: 68
End: 2023-06-01

## 2023-06-01 VITALS
TEMPERATURE: 98.1 F | WEIGHT: 242 LBS | DIASTOLIC BLOOD PRESSURE: 82 MMHG | HEART RATE: 80 BPM | SYSTOLIC BLOOD PRESSURE: 132 MMHG | OXYGEN SATURATION: 97 % | BODY MASS INDEX: 34.65 KG/M2 | HEIGHT: 70 IN

## 2023-06-01 DIAGNOSIS — E66.01 MORBID OBESITY (H): ICD-10-CM

## 2023-06-01 DIAGNOSIS — J45.30 MILD PERSISTENT ASTHMA WITHOUT COMPLICATION: ICD-10-CM

## 2023-06-01 DIAGNOSIS — Z12.5 SPECIAL SCREENING FOR MALIGNANT NEOPLASM OF PROSTATE: ICD-10-CM

## 2023-06-01 DIAGNOSIS — R03.0 ELEVATED BLOOD PRESSURE READING WITHOUT DIAGNOSIS OF HYPERTENSION: ICD-10-CM

## 2023-06-01 DIAGNOSIS — Z00.00 ROUTINE GENERAL MEDICAL EXAMINATION AT A HEALTH CARE FACILITY: Primary | ICD-10-CM

## 2023-06-01 LAB
ALBUMIN SERPL-MCNC: 4.4 G/DL (ref 3.6–5.1)
ALBUMIN/GLOB SERPL: 1.8 {RATIO} (ref 1–2.5)
ALP SERPL-CCNC: 44 U/L (ref 33–130)
ALT 1742-6: 44 U/L (ref 0–32)
AST 1920-8: 30 U/L (ref 0–35)
BILIRUB SERPL-MCNC: 1.8 MG/DL (ref 0.2–1.2)
BUN SERPL-MCNC: 14 MG/DL (ref 7–25)
BUN/CREATININE RATIO: 13.7 (ref 6–32)
CALCIUM SERPL-MCNC: 9.5 MG/DL (ref 8.6–10.3)
CHLORIDE SERPLBLD-SCNC: 106.3 MMOL/L (ref 98–110)
CHOLEST SERPL-MCNC: 144 MG/DL (ref 0–199)
CHOLEST/HDLC SERPL: 3 {RATIO} (ref 0–5)
CO2 SERPL-SCNC: 27.7 MMOL/L (ref 20–32)
CREAT SERPL-MCNC: ABNORMAL MG/DL (ref 0.6–1.3)
GLOBULIN, CALCULATED - QUEST: 2.5 (ref 1.9–3.7)
GLUCOSE SERPL-MCNC: 88 MG/DL (ref 60–99)
HDLC SERPL-MCNC: 48 MG/DL (ref 40–150)
LDLC SERPL CALC-MCNC: 82 MG/DL (ref 0–130)
POTASSIUM SERPL-SCNC: 4.34 MMOL/L (ref 3.5–5.3)
PROT SERPL-MCNC: 6.9 G/DL (ref 6.1–8.1)
SODIUM SERPL-SCNC: 140.1 MMOL/L (ref 135–146)
TRIGL SERPL-MCNC: 68 MG/DL (ref 0–149)

## 2023-06-01 PROCEDURE — 99397 PER PM REEVAL EST PAT 65+ YR: CPT | Performed by: FAMILY MEDICINE

## 2023-06-01 PROCEDURE — 80061 LIPID PANEL: CPT | Performed by: FAMILY MEDICINE

## 2023-06-01 PROCEDURE — 84153 ASSAY OF PSA TOTAL: CPT | Mod: 90 | Performed by: FAMILY MEDICINE

## 2023-06-01 PROCEDURE — 36415 COLL VENOUS BLD VENIPUNCTURE: CPT | Performed by: FAMILY MEDICINE

## 2023-06-01 PROCEDURE — 80053 COMPREHEN METABOLIC PANEL: CPT | Performed by: FAMILY MEDICINE

## 2023-06-01 ASSESSMENT — ASTHMA QUESTIONNAIRES
ACT_TOTALSCORE: 24
ACT_TOTALSCORE: 24
QUESTION_2 LAST FOUR WEEKS HOW OFTEN HAVE YOU HAD SHORTNESS OF BREATH: NOT AT ALL
QUESTION_3 LAST FOUR WEEKS HOW OFTEN DID YOUR ASTHMA SYMPTOMS (WHEEZING, COUGHING, SHORTNESS OF BREATH, CHEST TIGHTNESS OR PAIN) WAKE YOU UP AT NIGHT OR EARLIER THAN USUAL IN THE MORNING: NOT AT ALL
QUESTION_5 LAST FOUR WEEKS HOW WOULD YOU RATE YOUR ASTHMA CONTROL: WELL CONTROLLED
ACUTE_EXACERBATION_TODAY: NO
QUESTION_4 LAST FOUR WEEKS HOW OFTEN HAVE YOU USED YOUR RESCUE INHALER OR NEBULIZER MEDICATION (SUCH AS ALBUTEROL): NOT AT ALL
QUESTION_1 LAST FOUR WEEKS HOW MUCH OF THE TIME DID YOUR ASTHMA KEEP YOU FROM GETTING AS MUCH DONE AT WORK, SCHOOL OR AT HOME: NONE OF THE TIME

## 2023-06-01 NOTE — PROGRESS NOTES
3  SUBJECTIVE:   CC: Sarthak Berrios is an 67 year old male who presents for preventive health visit.       Patient has been advised of split billing requirements and indicates understanding: Yes  Healthy Habits:    Do you get at least three servings of calcium containing foods daily (dairy, green leafy vegetables, etc.)? yes    Amount of exercise or daily activities, outside of work: most day(s) per week    Problems taking medications regularly No    Medication side effects: No    Have you had an eye exam in the past two years? yes    Do you see a dentist twice per year? yes    Do you have sleep apnea, excessive snoring or daytime drowsiness?no          Today's PHQ-2 Score:     6/1/2023    12:26 PM 2/8/2022    12:32 PM   PHQ-2 ( 1999 Pfizer)   Q1: Little interest or pleasure in doing things 0 0   Q2: Feeling down, depressed or hopeless 0 0   PHQ-2 Score 0 0       Abuse: Current or Past(Physical, Sexual or Emotional)- No  Do you feel safe in your environment? Yes        Social History     Tobacco Use     Smoking status: Never     Passive exposure: Never     Smokeless tobacco: Never   Vaping Use     Vaping status: Not on file   Substance Use Topics     Alcohol use: Yes     Alcohol/week: 2.0 standard drinks of alcohol     Types: 2 Standard drinks or equivalent per week     If you drink alcohol do you typically have >3 drinks per day or >7 drinks per week? No                      Last PSA:   [facility]ott PSA   Date Value Ref Range Status   11/30/2021 2.94 < OR = 4.00 ng/mL Final     Comment:     The total PSA value from this assay system is   standardized against the WHO standard. The test   result will be approximately 20% lower when compared   to the equimolar-standardized total PSA (Estefanía   Ade). Comparison of serial PSA results should be   interpreted with this fact in mind.     This test was performed using the Siemens   chemiluminescent method. Values obtained from   different assay methods cannot be  used  interchangeably. PSA levels, regardless of  value, should not be interpreted as absolute  evidence of the presence or absence of disease.         Reviewed orders with patient. Reviewed health maintenance and updated orders accordingly - Yes  BP Readings from Last 3 Encounters:   06/01/23 132/82   11/09/22 134/84   02/08/22 (!) 152/84    Wt Readings from Last 3 Encounters:   06/01/23 109.8 kg (242 lb)   11/09/22 111.2 kg (245 lb 3.2 oz)   11/30/21 109.3 kg (241 lb)                  Patient Active Problem List   Diagnosis     Family history of malignant neoplasm of prostate     Osteoarthritis     FAMILY HX GI MALIGNANCY     Health Care Home     Degenerative arthritis of knee     History of total bilateral knee replacement     Mild intermittent asthma without complication     Elevated blood pressure reading without diagnosis of hypertension     Morbid obesity (H)     Past Surgical History:   Procedure Laterality Date     ARTHROPLASTY KNEE Left 11/17/2015    Procedure: ARTHROPLASTY KNEE;  Surgeon: Alfie Ingram MD;  Location: [facility] OR     AS TOTAL KNEE ARTHROPLASTY Right      COLONOSCOPY N/A 1/16/2019    Procedure: COLONOSCOPY;  Surgeon: Paul Carvajal MD;  Location:  GI     HC KNEE SCOPE, DIAGNOSTIC  1999    Arthroscopy, Knee   Right  Dr. Ingram     LIGATN/STRIP LONG OR SHORT SAPHEN  2000     [facility] COLONOSCOPY THRU STOMA, DIAGNOSTIC  2009     [facility] REPAIR UMBILICAL LILLIAM,5+Y/O,REDUC  1996       Social History     Tobacco Use     Smoking status: Never     Passive exposure: Never     Smokeless tobacco: Never   Vaping Use     Vaping status: Not on file   Substance Use Topics     Alcohol use: Yes     Alcohol/week: 2.0 standard drinks of alcohol     Types: 2 Standard drinks or equivalent per week     Family History   Problem Relation Age of Onset     Alcohol/Drug Father      Cancer Maternal Grandfather         Prostate     Cancer Brother         Bladder     Cardiovascular No family hx of      Diabetes No family hx  of      Cancer - colorectal No family hx of      Breast Cancer No family hx of          Current Outpatient Medications   Medication Sig Dispense Refill     albuterol (ACCUNEB) 1.25 MG/3ML neb solution Take 1 vial (1.25 mg) by nebulization 3 times daily as needed for shortness of breath / dyspnea or wheezing 3 mL 3     albuterol (VENTOLIN HFA) 108 (90 Base) MCG/ACT inhaler INHALE 2 PUFFS INTO THE LUNGS EVERY 6 HOURS AS NEEDED FOR SHORTNESS OF BREATH OR DIFFICULT BREATHING 18 g 0     FLOVENT  MCG/ACT inhaler Inhale 2 puffs into the lungs 2 times daily 12 g 1     multivitamin, therapeutic with minerals (MULTI-VITAMIN) TABS Take 1 tablet by mouth daily       Allergies   Allergen Reactions     No Known Allergies      Recent Labs   Lab Test 11/30/21  1522 11/30/21  1521 01/11/18  1011 05/04/15  1136     --  87  --    HDL 57  --  42  --    TRIG 116  --  63  --    ALT  --   --  79*  --    CR  --  1.03 1.17 0.99   GFRESTIMATED  --   --  67 84   POTASSIUM  --  4.95 4.4 4.4        Reviewed and updated as needed this visit by clinical staff   Tobacco  Allergies   Problems             Reviewed and updated as needed this visit by Provider                 Past Medical History:   Diagnosis Date     Arthritis      Family history of malignant neoplasm of prostate     MGF     FAMILY HX GI MALIGNANCY 9/23/2006    MGF had colon cancer     Mild persistent asthma 1/7/2015     Mild persistent asthma 1/7/2015     Other chronic pain     JOint pain for over 10 years     Uncomplicated asthma       Past Surgical History:   Procedure Laterality Date     ARTHROPLASTY KNEE Left 11/17/2015    Procedure: ARTHROPLASTY KNEE;  Surgeon: Alfie Ingram MD;  Location: [facility] OR     AS TOTAL KNEE ARTHROPLASTY Right      COLONOSCOPY N/A 1/16/2019    Procedure: COLONOSCOPY;  Surgeon: Paul Carvajal MD;  Location:  GI     HC KNEE SCOPE, DIAGNOSTIC  1999    Arthroscopy, Knee   Right  Dr. Juan Jsoe CARTER/AMISHA LONG OR SHORT SAPHEN  2000  "    [facility]     [facility]5+Y/[facility],REDUC  1996       ROS:  CONSTITUTIONAL: NEGATIVE for fever, chills, change in weight  INTEGUMENTARY/SKIN: NEGATIVE for worrisome rashes, moles or lesions  EYES: NEGATIVE for vision changes or irritation  ENT: NEGATIVE for ear, mouth and throat problems  RESP: NEGATIVE for significant cough or SOB  CV: NEGATIVE for chest pain, palpitations or peripheral edema  GI: NEGATIVE for nausea, abdominal pain, heartburn, or change in bowel habits   male: negative for dysuria, hematuria, decreased urinary stream, erectile dysfunction, urethral discharge  MUSCULOSKELETAL: NEGATIVE for significant arthralgias or myalgia  NEURO: NEGATIVE for weakness, dizziness or paresthesias  ENDOCRINE: NEGATIVE for temperature intolerance, skin/hair changes  PSYCHIATRIC: NEGATIVE for changes in mood or affect    OBJECTIVE:   /82 (BP Location: Right arm, Patient Position: Sitting, Cuff Size: Adult Large)   Pulse 80   Temp 98.1  F (36.7  C) (Temporal)   Ht 1.765 m (5' 9.5\")   Wt 109.8 kg (242 lb)   SpO2 97%   BMI 35.22 kg/m    EXAM:  GENERAL: healthy, alert and no distress  EYES: Eyes grossly normal to inspection, PERRL and conjunctivae and sclerae normal  HENT: ear canals and TM's normal, nose and mouth without ulcers or lesions  NECK: no adenopathy, no asymmetry, masses, or scars and thyroid normal to palpation  RESP: lungs clear to auscultation - no rales, rhonchi or wheezes  CV: regular rate and rhythm, normal S1 S2, no S3 or S4, no murmur, click or rub, no peripheral edema and peripheral pulses strong  ABDOMEN: soft, nontender, no hepatosplenomegaly, no masses and bowel sounds normal   (male): normal male genitalia without lesions or urethral discharge, no hernia  MS: no gross musculoskeletal defects noted, no edema  SKIN: no suspicious lesions or rashes  NEURO: Normal strength and tone, mentation intact and speech normal  PSYCH: mentation " "appears normal, affect normal/bright    Diagnostic Test Results:  Labs reviewed in Epic    ASSESSMENT/PLAN:   (Z00.00) Routine general medical examination at a health care facility  (primary encounter diagnosis)  Comment: discussed preventitive healthcare   Plan: Continue to work on healthy diet and exercise, discussed healthy habits     (R03.0) Elevated blood pressure reading without diagnosis of hypertension  Comment: improved  Plan:     (J45.30) Mild persistent asthma without complication  Comment: well controlled  Plan: continue current medications at current doses     (E66.01) Morbid obesity (H)  Comment:   Plan: Continue to work on healthy diet and exercise, discussed healthy habits     (Z12.5) Special screening for malignant neoplasm of prostate  Comment:   Plan:     Patient has been advised of split billing requirements and indicates understanding: Yes  COUNSELING:  Reviewed preventive health counseling, as reflected in patient instructions       Regular exercise       Healthy diet/nutrition       Vision screening       Colorectal cancer screening       Prostate cancer screening    Estimated body mass index is 35.22 kg/m  as calculated from the following:    Height as of this encounter: 1.765 m (5' 9.5\").    Weight as of this encounter: 109.8 kg (242 lb).    Weight management plan: Discussed healthy diet and exercise guidelines    He reports that he has never smoked. He has never been exposed to tobacco smoke. He has never used smokeless tobacco.      Counseling Resources:  ATP IV Guidelines  Pooled Cohorts Equation Calculator  FRAX Risk Assessment  ICSI Preventive Guidelines  Dietary Guidelines for Americans, 2010  USDA's MyPlate  ASA Prophylaxis  Lung CA Screening    Paul Styles MD  [facility]  "

## 2023-06-01 NOTE — NURSING NOTE
Chief Complaint   Patient presents with     Physical     Fasting today      Derm Problem     Check spot on top of his head      Pre-visit Screening:  Immunizations:  not up to date - tdap at pharmacy   Colonoscopy:  is up to date  Mammogram: NA  Asthma Action Test/Plan:  NA  PHQ9:  NA  GAD7:  NA  Questioned patient about current smoking habits Pt. has never smoked.  Ok to leave detailed message on voice mail for today's visit only Yes, phone # [phone]

## 2023-06-02 LAB — ABBOTT PSA - QUEST: 3.12 NG/ML

## 2023-10-19 ENCOUNTER — ALLIED HEALTH/NURSE VISIT (OUTPATIENT)
Dept: FAMILY MEDICINE | Facility: CLINIC | Age: 68
End: 2023-10-19

## 2023-10-19 DIAGNOSIS — Z23 NEED FOR VACCINATION: Primary | ICD-10-CM

## 2023-10-19 PROCEDURE — 90471 IMMUNIZATION ADMIN: CPT | Performed by: FAMILY MEDICINE

## 2023-10-19 PROCEDURE — 90662 IIV NO PRSV INCREASED AG IM: CPT | Performed by: FAMILY MEDICINE

## 2023-11-28 ENCOUNTER — OFFICE VISIT (OUTPATIENT)
Dept: FAMILY MEDICINE | Facility: CLINIC | Age: 68
End: 2023-11-28

## 2023-11-28 VITALS
OXYGEN SATURATION: 97 % | SYSTOLIC BLOOD PRESSURE: 126 MMHG | HEART RATE: 99 BPM | DIASTOLIC BLOOD PRESSURE: 74 MMHG | RESPIRATION RATE: 20 BRPM | TEMPERATURE: 97.1 F

## 2023-11-28 DIAGNOSIS — R05.1 ACUTE COUGH: Primary | ICD-10-CM

## 2023-11-28 LAB — COVID-19: NEGATIVE

## 2023-11-28 PROCEDURE — 87635 SARS-COV-2 COVID-19 AMP PRB: CPT | Performed by: FAMILY MEDICINE

## 2023-11-28 PROCEDURE — 99213 OFFICE O/P EST LOW 20 MIN: CPT | Performed by: FAMILY MEDICINE

## 2023-11-28 NOTE — PROGRESS NOTES
SUBJECTIVE:   Sarthak Berrios is a 68 year old male who complains of runny nose, headache, sore throat, cough, wheezing, chills, and fatigue for 3 days. He denies a history of harsh cough, sweats, myalgias, shortness of breath, and chest pain and Admits to a history of asthma. Patient does not smoke cigarettes.    Pt has tried nyquil, melatonin, isael seltzer, ibuprofen     OBJECTIVE:/74 (BP Location: Right arm, Patient Position: Chair, Cuff Size: Adult Regular)   Pulse 99   Temp 97.1  F (36.2  C) (Temporal)   Resp 20   SpO2 97%    He appears well, vital signs are as noted by the nurse. Ears normal.  Throat and pharynx normal.  Neck supple. No adenopathy in the neck. Nose is congested. Sinuses non tender. The chest is clear, without wheezes or rales.    Covid neg    ASSESSMENT:   Viral upper respiratory illness    PLAN:  Symptomatic therapy suggested: push fluids and rest. Call or return to clinic prn if these symptoms worsen or fail to improve as anticipated.

## 2023-11-28 NOTE — NURSING NOTE
Sarthak Berrios is here for a sore throat and congestion.    Questioned patient about current smoking habits.  Pt. has never smoked.  PULSE regular  My Chart: active  CLASSIFICATION OF OVERWEIGHT AND OBESITY BY BMI                        Obesity Class           BMI(kg/m2)  Underweight                                    < 18.5  Normal                                         18.5-24.9  Overweight                                     25.0-29.9  OBESITY                     I                  30.0-34.9                             II                 35.0-39.9  EXTREME OBESITY             III                >40                            Patient's  BMI There is no height or weight on file to calculate BMI.  http://hin.nhlbi.nih.gov/menuplanner/menu.cgi  Pre-visit planning  Immunizations - up to date  Colonoscopy - is up to date  Mammogram -   Asthma -   PHQ9 -    TIM-7 -      The patient has verbalized that it is ok to leave a detailed voice message on the patient's cell phone with results/recommendations from this visit.

## 2024-03-21 ENCOUNTER — OFFICE VISIT (OUTPATIENT)
Dept: FAMILY MEDICINE | Facility: CLINIC | Age: 69
End: 2024-03-21

## 2024-03-21 VITALS
DIASTOLIC BLOOD PRESSURE: 82 MMHG | OXYGEN SATURATION: 97 % | WEIGHT: 247 LBS | HEIGHT: 70 IN | SYSTOLIC BLOOD PRESSURE: 138 MMHG | BODY MASS INDEX: 35.36 KG/M2 | HEART RATE: 89 BPM | TEMPERATURE: 97.7 F

## 2024-03-21 DIAGNOSIS — R05.1 ACUTE COUGH: Primary | ICD-10-CM

## 2024-03-21 DIAGNOSIS — J45.30 MILD PERSISTENT ASTHMA WITHOUT COMPLICATION: ICD-10-CM

## 2024-03-21 PROCEDURE — 99213 OFFICE O/P EST LOW 20 MIN: CPT | Performed by: PHYSICIAN ASSISTANT

## 2024-03-21 RX ORDER — BENZONATATE 200 MG/1
200 CAPSULE ORAL 3 TIMES DAILY PRN
Qty: 30 CAPSULE | Refills: 0 | Status: SHIPPED | OUTPATIENT
Start: 2024-03-21 | End: 2024-04-29

## 2024-03-21 RX ORDER — PREDNISONE 20 MG/1
40 TABLET ORAL DAILY
Qty: 10 TABLET | Refills: 0 | Status: SHIPPED | OUTPATIENT
Start: 2024-03-21 | End: 2024-04-29

## 2024-03-21 RX ORDER — ALBUTEROL SULFATE 90 UG/1
AEROSOL, METERED RESPIRATORY (INHALATION)
Qty: 18 G | Refills: 1 | Status: SHIPPED | OUTPATIENT
Start: 2024-03-21

## 2024-03-21 RX ORDER — DEXAMETHASONE 4 MG/1
2 TABLET ORAL 2 TIMES DAILY
Qty: 12 G | Refills: 1 | Status: SHIPPED | OUTPATIENT
Start: 2024-03-21 | End: 2024-03-25

## 2024-03-21 ASSESSMENT — ASTHMA QUESTIONNAIRES: ACT_TOTALSCORE: 21

## 2024-03-21 NOTE — PROGRESS NOTES
"  Assessment & Plan     Mild persistent asthma without complication-stable, refilled without change    - Asthma Action Plan (AAP)  - albuterol (VENTOLIN HFA) 108 (90 Base) MCG/ACT inhaler  Dispense: 18 g; Refill: 1  - FLOVENT  MCG/ACT inhaler  Dispense: 12 g; Refill: 1    Acute cough-suspect post infectious cough vs possible chronic sinus infection, discussed with patient and will control wheezing/cough and if no improvement, try AB for sinus infection  If no improvement after 5-7 days, would Rx Augmentin BID 7 days  - predniSONE (DELTASONE) 20 MG tablet  Dispense: 10 tablet; Refill: 0  - benzonatate (TESSALON) 200 MG capsule  Dispense: 30 capsule; Refill: 0      Follow up as needed    No follow-ups on file.    Subjective   Sarthak is a 68 year old, presenting for the following health issues:  URI (Cough for 3-4 weeks, worse at night/Plugged right ear/No fever/Sinus congestion and blowing nose, varies/Has gone through two bottles of Delsym, and a bottle of NyQuil, ibuprofen during the daytime)    HPI     Pt presents with ongoing cough. Started with a cold 3-4 weeks ago. No fevers/chills. Did miss 1 day of work. Cough has been inhibiting his sleep. Dry cough overall. No fevers/chills. Feels well otherwise. Has tried 2 bottles of delsym for this and Nyquil at night (no real improvement with this). Lots of sinus congestion, drainage out of nose. No pain/pressure in face.     Needs inhaler refills-uses when ill. Currently using inhalers with his current cough. Uses a total of 3-4x a year. No concerns. Working well.         Review of Systems  Constitutional, HEENT, cardiovascular, pulmonary, gi and gu systems are negative, except as otherwise noted.      Objective    /82 (BP Location: Left arm, Patient Position: Sitting, Cuff Size: Adult Large)   Pulse 89   Temp 97.7  F (36.5  C) (Temporal)   Ht 1.765 m (5' 9.5\")   Wt 112 kg (247 lb)   SpO2 97%   BMI 35.95 kg/m    Body mass index is 35.95 kg/m .  Physical " Exam   GENERAL: alert and no distress  HENT: ear canals and TM's normal, nose and mouth without ulcers or lesions  NECK: no adenopathy, no asymmetry, masses, or scars  RESP: expiratory wheezes throughout  CV: regular rate and rhythm, normal S1 S2, no S3 or S4, no murmur, click or rub, no peripheral edema  ABDOMEN: soft, nontender, no hepatosplenomegaly, no masses and bowel sounds normal  MS: no gross musculoskeletal defects noted, no edema  NEURO: Normal strength and tone, mentation intact and speech normal  PSYCH: mentation appears normal, affect normal/bright            Signed Electronically by: Chaka Polanco PA-C

## 2024-03-21 NOTE — NURSING NOTE
Chief Complaint   Patient presents with    URI     Cough for 3-4 weeks, worse at night  Plugged right ear  No fever  Sinus congestion and blowing nose, varies  Has gone through two bottles of Delsym, and a bottle of NyQuil, ibuprofen during the daytime         Pre-visit Screening:  Immunizations:  up to date  Colonoscopy:  is up to date  Mammogram: NA  Asthma Action Test/Plan:  NA  PHQ9:  NA  GAD7:  NA  Questioned patient about current smoking habits Pt. has never smoked.  Ok to leave detailed message on voice mail for today's visit only Yes, phone # [phone]

## 2024-03-25 ENCOUNTER — TELEPHONE (OUTPATIENT)
Dept: FAMILY MEDICINE | Facility: CLINIC | Age: 69
End: 2024-03-25

## 2024-03-25 DIAGNOSIS — J45.30 MILD PERSISTENT ASTHMA WITHOUT COMPLICATION: Primary | ICD-10-CM

## 2024-03-25 NOTE — TELEPHONE ENCOUNTER
Can you review for [facility]    Flovent is no longer made, can you send in another Rx    Sarthak Berrios is requesting a refill of:    Pending Prescriptions:                       Disp   Refills    fluticasone (ARNUITY ELLIPTA) 100 MCG/ACT*30 each1            Sig: Inhale 1 puff into the lungs daily

## 2024-04-29 ENCOUNTER — OFFICE VISIT (OUTPATIENT)
Dept: FAMILY MEDICINE | Facility: CLINIC | Age: 69
End: 2024-04-29

## 2024-04-29 VITALS
WEIGHT: 247 LBS | HEART RATE: 68 BPM | BODY MASS INDEX: 35.36 KG/M2 | TEMPERATURE: 97.9 F | DIASTOLIC BLOOD PRESSURE: 82 MMHG | SYSTOLIC BLOOD PRESSURE: 144 MMHG | HEIGHT: 70 IN | RESPIRATION RATE: 20 BRPM

## 2024-04-29 DIAGNOSIS — B35.6 TINEA CRURIS: ICD-10-CM

## 2024-04-29 DIAGNOSIS — E66.01 MORBID OBESITY (H): ICD-10-CM

## 2024-04-29 DIAGNOSIS — G47.26 SHIFTING SLEEP-WORK SCHEDULE: ICD-10-CM

## 2024-04-29 DIAGNOSIS — L82.1 SEBORRHEIC KERATOSES: Primary | ICD-10-CM

## 2024-04-29 DIAGNOSIS — R21 RASH AND NONSPECIFIC SKIN ERUPTION: ICD-10-CM

## 2024-04-29 PROCEDURE — 99214 OFFICE O/P EST MOD 30 MIN: CPT | Performed by: FAMILY MEDICINE

## 2024-04-29 NOTE — PROGRESS NOTES
"SUBJECTIVE:  Sarthak Berrios, a 68 year old male scheduled an appointment to discuss the following issues:     Seborrheic keratoses  Rash and nonspecific skin eruption  Tinea cruris  Morbid obesity (H)  Shifting sleep-work schedule  Pt has been told he has a suspicious mole left posterior arm, unsure how long present, no pain or bleeding    Pt notes redness and itching in neck creases anteriorly-he does shave this area    Pt notes \"jock itch\" -has tried some antifungal cream but seems to still come and go    Pt interested in possible GLP-1 medications.  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  He is currently working 4:30-9:30 am and this works ok for him but they have told him he might have to move to 3:30-8:30 in 6 months unless a doctor writes a note.  Pt finds he is not able to function well if he starts that early as he has to wake up at 2:30 am    Medical, social, surgical, and family histories reviewed.    Patient Active Problem List   Diagnosis    Family history of malignant neoplasm of prostate    Osteoarthritis    FAMILY HX GI MALIGNANCY    Health Care Home    Degenerative arthritis of knee    History of total bilateral knee replacement    Mild intermittent asthma without complication    Elevated blood pressure reading without diagnosis of hypertension    Morbid obesity (H)       Past Medical History:   Diagnosis Date    Arthritis     Family history of malignant neoplasm of prostate     MGF    FAMILY HX GI MALIGNANCY 9/23/2006    MGF had colon cancer    Mild persistent asthma 1/7/2015    Mild persistent asthma 1/7/2015    Other chronic pain     JOint pain for over 10 years    Uncomplicated asthma        Family History   Problem Relation Age of Onset    Alcohol/Drug Father     Cancer Maternal Grandfather         Prostate    Cancer Brother         Bladder    Cardiovascular No family hx of     Diabetes No " family hx of     Cancer - colorectal No family hx of     Breast Cancer No family hx of        Social History     Socioeconomic History    Marital status:      Spouse name: Lindy    Number of children: 5    Years of education: 16    Highest education level: Not on file   Occupational History    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     Comment: [employer]     Employer:     Tobacco Use    Smoking status: Never     Passive exposure: Never    Smokeless tobacco: Never   Substance and Sexual Activity    Alcohol use: Yes     Alcohol/week: 2.0 standard drinks of alcohol     Types: 2 Standard drinks or equivalent per week    Drug use: No    Sexual activity: Yes     Partners: Female     Birth control/protection: Post-menopausal   Other Topics Concern     Service Not Asked    Blood Transfusions Not Asked    Caffeine Concern Not Asked    Occupational Exposure Not Asked    Hobby Hazards Not Asked    Sleep Concern Not Asked    Stress Concern Not Asked    Weight Concern Not Asked    Special Diet Not Asked    Back Care Not Asked    Exercise Yes    Bike Helmet Not Asked    Seat Belt Yes    Self-Exams Yes    Parent/sibling w/ CABG, MI or angioplasty before 65F 55M? Not Asked   Social History Narrative    Not on file     Social Determinants of Health     Financial Resource Strain: Not on file   Food Insecurity: Not on file   Transportation Needs: Not on file   Physical Activity: Not on file   Stress: Not on file   Social Connections: Not on file   Interpersonal Safety: Not on file   Housing Stability: Not on file       Past Surgical History:   Procedure Laterality Date    ARTHROPLASTY KNEE Left 11/17/2015    Procedure: ARTHROPLASTY KNEE;  Surgeon: Alfie Ingram MD;  Location:  OR    AS TOTAL KNEE ARTHROPLASTY Right     COLONOSCOPY N/A 1/16/2019    Procedure: COLONOSCOPY;  Surgeon: Paul Carvajal MD;  Location:  GI     KNEE SCOPE, DIAGNOSTIC  1999    Arthroscopy, Knee   Right  Dr. Ingram     LIGATN/STRIP LONG OR SHORT SAPHEN  2000    [facility] COLONOSCOPY THRU STOMA, DIAGNOSTIC  2009    [facility] REPAIR UMBILICAL LILLIAM,5+Y/O,REDUC  1996       Current Outpatient Medications   Medication Sig Dispense Refill    albuterol (VENTOLIN HFA) 108 (90 Base) MCG/ACT inhaler INHALE 2 PUFFS INTO THE LUNGS EVERY 6 HOURS AS NEEDED FOR SHORTNESS OF BREATH OR DIFFICULT BREATHING 18 g 1    multivitamin, therapeutic with minerals (MULTI-VITAMIN) TABS Take 1 tablet by mouth daily      albuterol (ACCUNEB) 1.25 MG/3ML neb solution Take 1 vial (1.25 mg) by nebulization 3 times daily as needed for shortness of breath / dyspnea or wheezing 3 mL 3    fluticasone (ARNUITY ELLIPTA) 100 MCG/ACT inhaler Inhale 1 puff into the lungs daily 30 each 1     No current facility-administered medications for this visit.        Allergies: No known allergies      Immunization History   Administered Date(s) Administered    COVID-19 Bivalent 12+ (Pfizer) 09/19/2022    COVID-19 MONOVALENT 12+ (Pfizer) 03/25/2021, 04/15/2021, 11/29/2021    Influenza (IIV3) PF 11/24/2003    Influenza (intradermal) 10/08/2015    Influenza Vaccine 65+ (Fluzone HD) 11/30/2021, 09/23/2022, 10/19/2023    Influenza Vaccine >6 months,quad, PF 10/13/2016, 10/12/2017, 09/20/2019, 11/02/2020    Influenza Vaccine, 6+MO IM (QUADRIVALENT W/PRESERVATIVES) 10/03/2017, 10/04/2018    Influenza vaccine ages 6-35 months 10/03/2017, 10/04/2018    Pneumococcal 20 valent Conjugate (Prevnar 20) 01/30/2023    Pneumococcal 23 valent 12/21/2021    TD,PF 7+ (Tenivac) 03/28/2003    TDAP Vaccine (Boostrix) 12/03/2011    Zoster recombinant adjuvanted (SHINGRIX) 12/24/2021, 04/22/2022        ROS:  CONSTITUTIONAL: NEGATIVE for fever, chills  EYES: NEGATIVE for vision changes   RESP: NEGATIVE for significant cough or SOB  CV: NEGATIVE for chest pain, palpitations   GI: NEGATIVE for nausea, abdominal pain, heartburn, or change in bowel habits  : NEGATIVE for frequency, dysuria, or hematuria  MUSCULOSKELETAL:  "NEGATIVE for significant arthralgias or myalgia  NEURO: NEGATIVE for weakness, dizziness or paresthesias or headache    OBJECTIVE:  BP (!) 144/82 (BP Location: Right arm, Patient Position: Chair, Cuff Size: Adult Large)   Pulse 68   Temp 97.9  F (36.6  C) (Temporal)   Resp 20   Ht 1.765 m (5' 9.5\")   Wt 112 kg (247 lb)   BMI 35.95 kg/m    EXAM:  GENERAL APPEARANCE: healthy, alert and no distress  EYES: EOMI,  PERRL  SKIN: anterior neck with erythematous maculopapular rash, no veiscles    Left tricep region reveals a 1-2 cm borwn/black raised, crusted lesion    Pt defers groin exam today    ASSESSMENT/PLAN:  (L82.1) Seborrheic keratoses  (primary encounter diagnosis)  Comment: pt with classic SK left upper arm, discussed benign lesions  Plan: reviewed ABCD's of skin lesion observation, including asymmetry, borders, color, and diameter. Pt. will observe for any of these changes as well as nonhealing sores.    Recommend yearly derm eval for skin survery    (R21) Rash and nonspecific skin eruption  Comment: pt with dermatitis, may be due to shaving  Plan: avoid shaving neck for a week, trial hydrocortisone cream    (B35.6) Tinea cruris  Comment: discussed tinea  Plan:  recommend antifungal cream, spray or powder OTC, use powder  to wick away moisture, avoid prolonged moisture exposure     (E66.01) Morbid obesity (H)  Comment: discussed weight loss in general, need to change habits, meds can help but long term success relies on habit changes  Plan: check with insurance on coverage    (G47.26) Shifting sleep-work schedule  Comment: discussed effects of shift work on health  Plan: I would be willing to write letter stating I recommend he not start work prior to 4:30-he will let me know if he needs it     *32 minute visit with chart review, discussion of multiple issues, exam, documentation *  "

## 2024-04-29 NOTE — NURSING NOTE
Sarthak Berrios is here for a mole on his upper left arm. Wants a consult for early work time, if it is harmful to someone his age. Also weight loss questions.    Questioned patient about current smoking habits.  Pt. has never smoked.  PULSE regular  My Chart: active  CLASSIFICATION OF OVERWEIGHT AND OBESITY BY BMI                        Obesity Class           BMI(kg/m2)  Underweight                                    < 18.5  Normal                                         18.5-24.9  Overweight                                     25.0-29.9  OBESITY                     I                  30.0-34.9                             II                 35.0-39.9  EXTREME OBESITY             III                >40                            Patient's  BMI Body mass index is 35.95 kg/m .  http://hin.nhlbi.nih.gov/menuplanner/menu.cgi  Pre-visit planning  Immunizations - up to date  Colonoscopy - is up to date  Mammogram -   Asthma -   PHQ9 -    TIM-7 -      The patient has verbalized that it is ok to leave a detailed voice message on the patient's cell phone with results/recommendations from this visit.

## 2024-04-30 ENCOUNTER — MYC MEDICAL ADVICE (OUTPATIENT)
Dept: FAMILY MEDICINE | Facility: CLINIC | Age: 69
End: 2024-04-30

## 2024-04-30 ENCOUNTER — TELEPHONE (OUTPATIENT)
Dept: FAMILY MEDICINE | Facility: CLINIC | Age: 69
End: 2024-04-30

## 2024-04-30 NOTE — TELEPHONE ENCOUNTER
Sarthak Berrios called the clinic support line with the following:    Pt is wanting to start a medication for weight loss. He has reached out to his insurance and Wegovy or Zepbound are covered.     [facility] to set up with you

## 2024-05-16 ENCOUNTER — OFFICE VISIT (OUTPATIENT)
Dept: FAMILY MEDICINE | Facility: CLINIC | Age: 69
End: 2024-05-16

## 2024-05-16 DIAGNOSIS — E66.01 MORBID OBESITY (H): Primary | ICD-10-CM

## 2024-05-16 NOTE — PROGRESS NOTES
SUBJECTIVE / OBJECTIVE:                                                Sarthak Berrios is a 68 year old male seen for an initial visit for Medication Therapy Management.  He was referred to me by Dr. Paul Styles.     REASON FOR MTM REFERRAL: medication management services     PATIENT CHIEF COMPLAINT/CONCERN: weight management    PAST MEDICAL HISTORY: Reviewed in chart    MEDICAL CONDITIONS REVIEWED:    Obesity    Current Outpatient Medications   Medication Sig Dispense Refill    tirzepatide-Weight Management (ZEPBOUND) 2.5 MG/0.5ML prefilled pen Inject 0.5 mLs (2.5 mg) Subcutaneous every 7 days 2 mL 0    albuterol (ACCUNEB) 1.25 MG/3ML neb solution Take 1 vial (1.25 mg) by nebulization 3 times daily as needed for shortness of breath / dyspnea or wheezing 3 mL 3    albuterol (VENTOLIN HFA) 108 (90 Base) MCG/ACT inhaler INHALE 2 PUFFS INTO THE LUNGS EVERY 6 HOURS AS NEEDED FOR SHORTNESS OF BREATH OR DIFFICULT BREATHING 18 g 1    fluticasone (ARNUITY ELLIPTA) 100 MCG/ACT inhaler Inhale 1 puff into the lungs daily 30 each 1    multivitamin, therapeutic with minerals (MULTI-VITAMIN) TABS Take 1 tablet by mouth daily         Current labs include:  BP Readings from Last 3 Encounters:   04/29/24 (!) 144/82   03/21/24 138/82   11/28/23 126/74       There were no vitals taken for this visit.    Most Recent Immunizations   Administered Date(s) Administered    COVID-19 Bivalent 12+ (Pfizer) 09/19/2022    COVID-19 MONOVALENT 12+ (Pfizer) 11/29/2021    Influenza (IIV3) PF 11/24/2003    Influenza (intradermal) 10/08/2015    Influenza Vaccine 65+ (Fluzone HD) 10/19/2023    Influenza Vaccine >6 months,quad, PF 11/02/2020    Influenza Vaccine, 6+MO IM (QUADRIVALENT W/PRESERVATIVES) 10/04/2018    Influenza vaccine ages 6-35 months 10/04/2018    Pneumococcal 20 valent Conjugate (Prevnar 20) 01/30/2023    Pneumococcal 23 valent 12/21/2021    TD,PF 7+ (Tenivac) 03/28/2003    TDAP Vaccine (Boostrix) 12/03/2011    Zoster  recombinant adjuvanted (SHINGRIX) 04/22/2022       ASSESSMENT / PLAN:                                                       Obesity:  Assessment:     Diet; Limiting calories and cut back on alcohol, ongoing. 78oz water per day    Working on sleep habits as well    Activity: Membership at gym: treadmill, weights, lots of steps at work (10,000-20,000 per day)    Zepbound Dosing:   Start Zepbound: It is a subcutaneous injection that you inject once weekly and titrate the dose slowly over time. Make sure inject the same time each day of the week, for example Monday evenings.  Each pen is single use.  In each prescription, you will get 4 pens in each box.  You will need a new prescription for each strength of the medication.  Week 1-4: Inject 2.5 mg once weekly  Week 5-8: If tolerating, increase to 5 mg once weekly  Week 9-12: If tolerating, increase to 7.5 mg once weekly  *If you are having some nausea or other side effects to where you are hesitant to move up to the next dose, stay at the same dose you are on for an additional week to see if side effect(s) improves/resolves. Make sure to take this time to hydrate and ensure you are drinking at least 64 oz water per day.  If you are wanting to stay at the same dose and do not have additional refills on that prescription please reach out to the clinic.    Zepbound Administration Demonstration    Zepbound Storage and Stability:   Make sure that when you get the prescription that you store the prescription in the refrigerator until it is time to use the Zepbound pen.  Once it is time to use the Zepbound pen, you can keep the pen at room temperature and it is good for up to 21 days at room temperature.     Zepbound Common Side Effects:   Nausea, diarrhea, constipation, headache, tiredness (fatigue), dizziness, stomach upset/pain. Less commonly, Zepbound can cause low blood sugar (symptoms: shaky, dizzy, sweaty, agitation). Please reach out to the care team should you feel  like this is occurring. It is important to ensure that you are eating consistent meals and not skipping meals. Ensure you are getting at least 64 oz water daily.     Status: Struggling with weight management    Drug Therapy Problems:  1) Patient appropriate candidate for GLP-1 for weight loss.    Plan:  1) Initiate Zepbound 2.5mg weekly  2) Call to follow up in 3-4 weeks to determine appropriate taper  3) Follow up in clinic for repeat lab work in 3 months with PCP      I spent 45 minutes with this patient today.  All changes were made via collaborative practice agreement with Paul Styles. A copy of the visit note was provided to the patient's primary care provider.    Margarita Mcclure, PharmD  Medication Therapy Management Pharmacist  [place]  Office Phone: [phone]

## 2024-05-16 NOTE — Clinical Note
Please see note. It appears patient's insurance covers Zepbound. Will initiate at 2.5mg weekly and taper as appropriate.

## 2024-05-21 ENCOUNTER — TELEPHONE (OUTPATIENT)
Dept: FAMILY MEDICINE | Facility: CLINIC | Age: 69
End: 2024-05-21

## 2024-06-06 ENCOUNTER — TELEPHONE (OUTPATIENT)
Dept: FAMILY MEDICINE | Facility: CLINIC | Age: 69
End: 2024-06-06

## 2024-06-06 DIAGNOSIS — E66.01 MORBID OBESITY (H): Primary | ICD-10-CM

## 2024-06-06 NOTE — TELEPHONE ENCOUNTER
Patient doing well with start of Zepbound. No notable side effects. Down approximately 5.5 lbs.     Will increase to 5mg at this time and patient will call in 4 weeks to update.    Margartia Mcclure, PharmD  Clinical Pharmacist  [facility] [place] Phone: [phone]  Direct Office Phone: [phone]

## 2024-07-02 ENCOUNTER — TELEPHONE (OUTPATIENT)
Dept: FAMILY MEDICINE | Facility: CLINIC | Age: 69
End: 2024-07-02

## 2024-07-02 DIAGNOSIS — E66.01 MORBID OBESITY (H): Primary | ICD-10-CM

## 2024-07-02 NOTE — TELEPHONE ENCOUNTER
Notes eating healthier options. No notable side effects.    Ready to increase to Zepbound 7.5mg.    Margarita Mcclure, PharmD  Clinical Pharmacist  [facility]  General Clinic Phone: [phone]  Direct Office Phone: [phone]

## 2024-07-31 ENCOUNTER — OFFICE VISIT (OUTPATIENT)
Dept: FAMILY MEDICINE | Facility: CLINIC | Age: 69
End: 2024-07-31

## 2024-07-31 VITALS
BODY MASS INDEX: 32.17 KG/M2 | OXYGEN SATURATION: 95 % | HEART RATE: 80 BPM | TEMPERATURE: 97.9 F | SYSTOLIC BLOOD PRESSURE: 128 MMHG | DIASTOLIC BLOOD PRESSURE: 78 MMHG | WEIGHT: 221 LBS

## 2024-07-31 DIAGNOSIS — E66.01 MORBID OBESITY (H): ICD-10-CM

## 2024-07-31 DIAGNOSIS — J45.30 MILD PERSISTENT ASTHMA WITHOUT COMPLICATION: Primary | ICD-10-CM

## 2024-07-31 DIAGNOSIS — R03.0 ELEVATED BLOOD PRESSURE READING WITHOUT DIAGNOSIS OF HYPERTENSION: ICD-10-CM

## 2024-07-31 PROCEDURE — 99214 OFFICE O/P EST MOD 30 MIN: CPT | Performed by: FAMILY MEDICINE

## 2024-07-31 PROCEDURE — G2211 COMPLEX E/M VISIT ADD ON: HCPCS | Performed by: FAMILY MEDICINE

## 2024-07-31 NOTE — NURSING NOTE
Chief Complaint   Patient presents with    Recheck Medication     Weight loss consult, has lost 26 lbs since 4/29/24     Pre-visit Screening:  Immunizations:  up to date  Colonoscopy:  is up to date  Mammogram: na  Asthma Action Test/Plan:  na  PHQ9:  na  GAD7:  na  Questioned patient about current smoking habits Pt. has never smoked.  Ok to leave detailed message on voice mail for today's visit only yes, phone # [phone] (home)

## 2024-07-31 NOTE — PROGRESS NOTES
"  Assessment & Plan     Mild persistent asthma without complication  Well controlled, continue current medications at current doses     Elevated blood pressure reading without diagnosis of hypertension  Resolved with weight loss, Check blood pressure readings outside of the clinic several times per week, write down values, and follow up if elevated within the next several weeks. Blood pressure can be checked at the firestation, drugstore,  or any valid site.     Morbid obesity (H)  Pt doing very well with zepbound, has managed constipation with OTC             BMI  Estimated body mass index is 32.17 kg/m  as calculated from the following:    Height as of 4/29/24: 1.765 m (5' 9.5\").    Weight as of this encounter: 100.2 kg (221 lb).   Weight management plan: Discussed healthy diet and exercise guidelines      FUTURE APPOINTMENTS:       - Follow-up visit in 6 mo  Work on weight loss  Regular exercise    No follow-ups on file.    David De León is a 68 year old, presenting for the following health issues:  Recheck Medication (Weight loss consult, has lost 26 lbs since 4/29/24)    HPI       PREHypertension Follow-up    Pt has lost a lot of weight with aepbound    Do you check your blood pressure regularly outside of the clinic? Yes   Are you following a low salt diet? No  Are your blood pressures ever more than 140 on the top number (systolic) OR more   than 90 on the bottom number (diastolic), for example 140/90? No    Asthma Follow-Up    Was ACT completed today?  No    Do you have a cough?  No  Are you experiencing any wheezing in your chest?  No  Do you have any shortness of breath?  No   How often are you using a short-acting (rescue) inhaler or nebulizer, such as Albuterol?  A few times a month  How many days per week do you miss taking your asthma controller medication?  0  Please describe any recent triggers for your asthma: pollens  Have you had any Emergency Room Visits, Urgent Care Visits, or Hospital " Admissions since your last office visit?  No  How many servings of fruits and vegetables do you eat daily?  2-3  On average, how many sweetened beverages do you drink each day (Examples: soda, juice, sweet tea, etc.  Do NOT count diet or artificially sweetened beverages)?   1  How many days per week do you exercise enough to make your heart beat faster? 5  How many minutes a day do you exercise enough to make your heart beat faster? 20 - 29  How many days per week do you miss taking your medication? 0  Obesity- pt using Zepbound, -has lost weight, only side effects are some constipation      Review of Systems  Constitutional, HEENT, cardiovascular, pulmonary, gi and gu systems are negative, except as otherwise noted.      Objective    /78 (BP Location: Left arm, Patient Position: Sitting, Cuff Size: Adult Large)   Pulse 80   Temp 97.9  F (36.6  C) (Temporal)   Wt 100.2 kg (221 lb)   SpO2 95%   BMI 32.17 kg/m    Body mass index is 32.17 kg/m .  Physical Exam   GENERAL: alert and no distress  EYES: Eyes grossly normal to inspection, PERRL and conjunctivae and sclerae normal  HENT: ear canals and TM's normal, nose and mouth without ulcers or lesions  NECK: no adenopathy, no asymmetry, masses, or scars  RESP: lungs clear to auscultation - no rales, rhonchi or wheezes  CV: regular rate and rhythm, normal S1 S2, no S3 or S4, no murmur, click or rub, no peripheral edema  ABDOMEN: soft, nontender, no hepatosplenomegaly, no masses and bowel sounds normal  MS: no gross musculoskeletal defects noted, no edema    Office Visit on 11/28/2023   Component Date Value Ref Range Status    COVID-19 11/28/2023 Negative   Final           Signed Electronically by: Paul Styles MD

## 2024-08-11 ENCOUNTER — HEALTH MAINTENANCE LETTER (OUTPATIENT)
Age: 69
End: 2024-08-11

## 2024-09-23 ENCOUNTER — ALLIED HEALTH/NURSE VISIT (OUTPATIENT)
Dept: FAMILY MEDICINE | Facility: CLINIC | Age: 69
End: 2024-09-23

## 2024-09-23 DIAGNOSIS — E66.01 MORBID OBESITY (H): Primary | ICD-10-CM

## 2024-09-23 DIAGNOSIS — Z23 NEED FOR VACCINATION: Primary | ICD-10-CM

## 2024-09-23 PROCEDURE — 90471 IMMUNIZATION ADMIN: CPT | Performed by: FAMILY MEDICINE

## 2024-09-23 PROCEDURE — 90662 IIV NO PRSV INCREASED AG IM: CPT | Performed by: FAMILY MEDICINE

## 2024-09-23 NOTE — NURSING NOTE
Chief Complaint   Patient presents with    Imm/Inj     High dose flu shot        
[address] Critical Care - 30 to 74 minutes

## 2024-09-23 NOTE — TELEPHONE ENCOUNTER
Patient doing well on Zepbound 10mg weekly. He is wanting to increase to 12.5mg as weight has started to plateau. He has lost a total of 40lbs since the end of April.     Patient is due for follow up appointment with Dr. Styles next month. Will send in one month of 12.5mg to get through until followup.    Margarita Mcclure, PharmD  Clinical Pharmacist  [facility]  General Clinic Phone: [phone]  Direct Office Phone: [phone]

## 2024-10-08 ENCOUNTER — OFFICE VISIT (OUTPATIENT)
Dept: FAMILY MEDICINE | Facility: CLINIC | Age: 69
End: 2024-10-08

## 2024-10-08 VITALS
HEIGHT: 70 IN | RESPIRATION RATE: 20 BRPM | SYSTOLIC BLOOD PRESSURE: 128 MMHG | TEMPERATURE: 97.1 F | WEIGHT: 203 LBS | DIASTOLIC BLOOD PRESSURE: 84 MMHG | HEART RATE: 84 BPM | BODY MASS INDEX: 29.06 KG/M2

## 2024-10-08 DIAGNOSIS — E66.01 MORBID OBESITY (H): Primary | ICD-10-CM

## 2024-10-08 DIAGNOSIS — G47.26 SHIFTING SLEEP-WORK SCHEDULE: ICD-10-CM

## 2024-10-08 PROCEDURE — 99213 OFFICE O/P EST LOW 20 MIN: CPT | Performed by: FAMILY MEDICINE

## 2024-10-08 ASSESSMENT — ASTHMA QUESTIONNAIRES: ACT_TOTALSCORE: 24

## 2024-10-08 NOTE — PROGRESS NOTES
"  Assessment & Plan     Morbid obesity (H)  Great progress, continue current medications at current doses     Shifting sleep-work schedule  Letter written to recommend work hours 4:30-9:30 due to sleep issues              FUTURE APPOINTMENTS:       - Follow-up visit in 6 mo  Regular exercise    No follow-ups on file.    Subjective   Sarthak is a 69 year old, presenting for the following health issues:  Recheck Medication    HPI         Obesity- has lost 47 pounds in 6 months using Zepbound, no side effects   How many servings of fruits and vegetables do you eat daily?  2-3  On average, how many sweetened beverages do you drink each day (Examples: soda, juice, sweet tea, etc.  Do NOT count diet or artificially sweetened beverages)?   1  How many days per week do you exercise enough to make your heart beat faster? 7  How many minutes a day do you exercise enough to make your heart beat faster? 60 or more  How many days per week do you miss taking your medication? 0    Pt requests letter to allow work hours from 4:30-9:30 am rather than 3:30- 8:30 as he was unable to function well with earlier times  Review of Systems  Constitutional, HEENT, cardiovascular, pulmonary, gi and gu systems are negative, except as otherwise noted.      Objective    /84 (BP Location: Left arm, Patient Position: Chair, Cuff Size: Adult Regular)   Pulse 84   Temp 97.1  F (36.2  C) (Temporal)   Resp 20   Ht 1.765 m (5' 9.5\")   Wt 92.1 kg (203 lb)   BMI 29.55 kg/m    Body mass index is 29.55 kg/m .  Physical Exam   GENERAL: alert and no distress  NECK: no adenopathy, no asymmetry, masses, or scars  RESP: lungs clear to auscultation - no rales, rhonchi or wheezes  CV: regular rate and rhythm, normal S1 S2, no S3 or S4, no murmur, click or rub, no peripheral edema  ABDOMEN: soft, nontender, no hepatosplenomegaly, no masses and bowel sounds normal  MS: no gross musculoskeletal defects noted, no edema    Office Visit on 11/28/2023 "   Component Date Value Ref Range Status    COVID-19 11/28/2023 Negative   Final           Signed Electronically by: Paul Styles MD

## 2024-10-08 NOTE — LETTER
Re: Sarthak Yash    To Whom It May Concern,     Sarthak is followed here for his jennifer care needs. I recommend he not work before 4:30 am in order to maintain a schedule that is healthier for him.     Please feel free to contact our office if you have further questions.     Sincerely,  FITO Styles M.D.

## 2024-10-08 NOTE — NURSING NOTE
Sarthak Berrios is here for a medication check for Zepbound.    Questioned patient about current smoking habits.  Pt. has never smoked.  PULSE regular  My Chart: active  CLASSIFICATION OF OVERWEIGHT AND OBESITY BY BMI                        Obesity Class           BMI(kg/m2)  Underweight                                    < 18.5  Normal                                         18.5-24.9  Overweight                                     25.0-29.9  OBESITY                     I                  30.0-34.9                             II                 35.0-39.9  EXTREME OBESITY             III                >40                            Patient's  BMI Body mass index is 29.55 kg/m .  http://hin.nhlbi.nih.gov/menuplanner/menu.cgi  Pre-visit planning  Immunizations - up to date  Colonoscopy - is up to date  Mammogram -   Asthma -   PHQ9 -    TIM-7 -      The patient has verbalized that it is ok to leave a detailed voice message on the patient's cell phone with results/recommendations from this visit.

## 2024-10-17 DIAGNOSIS — E66.01 MORBID OBESITY (H): ICD-10-CM

## 2024-12-11 DIAGNOSIS — E66.01 MORBID OBESITY (H): Primary | ICD-10-CM

## 2024-12-11 RX ORDER — TIRZEPATIDE 15 MG/.5ML
15 INJECTION, SOLUTION SUBCUTANEOUS
Qty: 6 ML | Refills: 0 | Status: SHIPPED | OUTPATIENT
Start: 2024-12-11

## 2024-12-11 NOTE — TELEPHONE ENCOUNTER
Patient has been taking Zepbound 15mg once weekly and is tolerating well. Does state that bowel movements have been every 2-3 days which is a bit more spaced out than normal, but states has not caused any problems.    Will continue on 15mg at this time.    Margarita Mcclure, PharmD  Clinical Pharmacist  [facility] Phone: [phone]  Direct Office Phone: [phone]

## 2025-02-01 DIAGNOSIS — E66.01 MORBID OBESITY (H): ICD-10-CM

## 2025-02-03 RX ORDER — TIRZEPATIDE 15 MG/.5ML
INJECTION, SOLUTION SUBCUTANEOUS
Qty: 6 ML | Refills: 0 | Status: SHIPPED | OUTPATIENT
Start: 2025-02-03

## 2025-02-03 NOTE — TELEPHONE ENCOUNTER
Sarthak Berrios is requesting a refill of:    Pending Prescriptions:                       Disp   Refills    ZEPBOUND 15 MG/0.5ML prefilled pen [Pharm*6 mL   0            Sig: ADMINISTER 15 MG UNDER THE SKIN EVERY 7 DAYS    Please close encounter if RX was sent. Thanks, Irene

## 2025-03-06 DIAGNOSIS — E66.01 MORBID OBESITY (H): Primary | ICD-10-CM

## 2025-03-06 NOTE — TELEPHONE ENCOUNTER
Sending in one additional month to cover until insurance discontinues coverage.    Margarita Mcclure, PharmD  Clinical Pharmacist  [facility]  General Clinic Phone: [phone]  Direct Office Phone: [phone]

## 2025-03-31 DIAGNOSIS — E66.01 MORBID OBESITY (H): ICD-10-CM

## 2025-04-01 RX ORDER — TIRZEPATIDE 15 MG/.5ML
INJECTION, SOLUTION SUBCUTANEOUS
COMMUNITY
Start: 2025-04-01

## 2025-04-01 NOTE — TELEPHONE ENCOUNTER
Sarthak Berrios is requesting a refill of:    Refused Prescriptions:                       Disp   Refills    tirzepatide-Weight Management (ZEPBOUND) 1*                Sig: ADMINISTER 15 MG UNDER THE SKIN EVERY 7 DAYS  Refused By: RA CABELLO  Reason for Refusal: Patient needs appointment    Needs OV for refills

## 2025-04-03 ENCOUNTER — OFFICE VISIT (OUTPATIENT)
Dept: FAMILY MEDICINE | Facility: CLINIC | Age: 70
End: 2025-04-03

## 2025-04-03 VITALS
DIASTOLIC BLOOD PRESSURE: 72 MMHG | BODY MASS INDEX: 27.06 KG/M2 | TEMPERATURE: 97.2 F | OXYGEN SATURATION: 100 % | WEIGHT: 189 LBS | HEART RATE: 63 BPM | SYSTOLIC BLOOD PRESSURE: 128 MMHG | HEIGHT: 70 IN

## 2025-04-03 DIAGNOSIS — E66.01 MORBID OBESITY (H): Primary | ICD-10-CM

## 2025-04-03 DIAGNOSIS — L84 CORN OR CALLUS: ICD-10-CM

## 2025-04-03 NOTE — PROGRESS NOTES
"  Assessment & Plan     Morbid obesity (H)  Great response to GLP-1, pt planning to stop medication due to cost after next refill, discussed healthy habits going forward to maintain weight loss    Corn or callus  Recommend pumice stone use, f/u if not improving or worsening             BMI  Estimated body mass index is 27.12 kg/m  as calculated from the following:    Height as of this encounter: 1.778 m (5' 10\").    Weight as of this encounter: 85.7 kg (189 lb).         FUTURE APPOINTMENTS:       - Follow-up visit in 6-12 mo  Regular exercise    No follow-ups on file.    Subjective   Sarthak is a 69 year old, presenting for the following health issues:  Recheck Medication (Weight loss med check. No side effects. May price hike up to 400 dollars so has to go off after this.)    HPI        Obesity-has done well with Zepbound for almost a year, down 58#, only side effects has bernardo constipation    Insurance just dropped from formulary- now will cost $429 so he plans to stop as this is not affordable    Pt also notes some painful callous right lateral forefoot  How many servings of fruits and vegetables do you eat daily?  2-3  On average, how many sweetened beverages do you drink each day (Examples: soda, juice, sweet tea, etc.  Do NOT count diet or artificially sweetened beverages)?   1  How many days per week do you exercise enough to make your heart beat faster? 7  How many minutes a day do you exercise enough to make your heart beat faster? 30 - 60  How many days per week do you miss taking your medication? 0        Review of Systems  Constitutional, HEENT, cardiovascular, pulmonary, gi and gu systems are negative, except as otherwise noted.      Objective    /72 (BP Location: Right arm, Patient Position: Sitting, Cuff Size: Adult Large)   Pulse 63   Temp 97.2  F (36.2  C) (Temporal)   Ht 1.778 m (5' 10\")   Wt 85.7 kg (189 lb)   SpO2 100%   BMI 27.12 kg/m    Body mass index is 27.12 kg/m .  Physical Exam "   GENERAL: alert and no distress  EYES: Eyes grossly normal to inspection, PERRL and conjunctivae and sclerae normal  HENT: ear canals and TM's normal, nose and mouth without ulcers or lesions  NECK: no adenopathy, no asymmetry, masses, or scars  RESP: lungs clear to auscultation - no rales, rhonchi or wheezes  CV: regular rate and rhythm, normal S1 S2, no S3 or S4, no murmur, click or rub, no peripheral edema  Skin: right forefoot: lateral callous    Office Visit on 11/28/2023   Component Date Value Ref Range Status    COVID-19 11/28/2023 Negative   Final           Signed Electronically by: Paul Styles MD

## 2025-04-03 NOTE — NURSING NOTE
Chief Complaint   Patient presents with    Recheck Medication     Weight loss med check. No side effects. May price hike up to 400 dollars so has to go off after this.     Pre-visit Screening:  Immunizations:  up to date  Colonoscopy:  is up to date  Mammogram: na  Asthma Action Test/Plan:  na  PHQ9:  na  GAD7:  na  Questioned patient about current smoking habits Pt. has never smoked.  Ok to leave detailed message on voice mail for today's visit only yes, phone # [phone] (home)

## 2025-05-12 ENCOUNTER — OFFICE VISIT (OUTPATIENT)
Dept: FAMILY MEDICINE | Facility: CLINIC | Age: 70
End: 2025-05-12

## 2025-05-12 VITALS
HEIGHT: 69 IN | HEART RATE: 80 BPM | DIASTOLIC BLOOD PRESSURE: 76 MMHG | BODY MASS INDEX: 27.4 KG/M2 | OXYGEN SATURATION: 98 % | SYSTOLIC BLOOD PRESSURE: 128 MMHG | WEIGHT: 185 LBS

## 2025-05-12 DIAGNOSIS — J45.30 MILD PERSISTENT ASTHMA WITHOUT COMPLICATION: ICD-10-CM

## 2025-05-12 DIAGNOSIS — R03.0 ELEVATED BLOOD PRESSURE READING WITHOUT DIAGNOSIS OF HYPERTENSION: ICD-10-CM

## 2025-05-12 DIAGNOSIS — E66.01 MORBID OBESITY (H): ICD-10-CM

## 2025-05-12 DIAGNOSIS — Z12.5 SPECIAL SCREENING FOR MALIGNANT NEOPLASM OF PROSTATE: ICD-10-CM

## 2025-05-12 DIAGNOSIS — Z00.00 ROUTINE GENERAL MEDICAL EXAMINATION AT A HEALTH CARE FACILITY: Primary | ICD-10-CM

## 2025-05-12 DIAGNOSIS — N52.9 ERECTILE DYSFUNCTION, UNSPECIFIED ERECTILE DYSFUNCTION TYPE: ICD-10-CM

## 2025-05-12 DIAGNOSIS — Z13.6 SCREENING FOR HEART DISEASE: ICD-10-CM

## 2025-05-12 DIAGNOSIS — Z23 NEED FOR VACCINATION: ICD-10-CM

## 2025-05-12 LAB
ALBUMIN SERPL-MCNC: 4.6 G/DL (ref 3.6–5.1)
ALP SERPL-CCNC: 39 U/L (ref 33–130)
ALT 1742-6: 20 U/L (ref 0–32)
AST 1920-8: 21 U/L (ref 0–35)
BILIRUB SERPL-MCNC: 1.3 MG/DL (ref 0.2–1.2)
BUN SERPL-MCNC: 12 MG/DL (ref 7–25)
BUN/CREATININE RATIO: 12 (ref 6–32)
CALCIUM SERPL-MCNC: 9.8 MG/DL (ref 8.6–10.3)
CHLORIDE SERPLBLD-SCNC: 108.9 MMOL/L (ref 98–110)
CHOLEST SERPL-MCNC: 163 MG/DL (ref 0–199)
CHOLEST/HDLC SERPL: 3 {RATIO} (ref 0–5)
CO2 SERPL-SCNC: 24.7 MMOL/L (ref 20–32)
CREAT SERPL-MCNC: 0.97 MG/DL (ref 0.6–1.3)
GLUCOSE SERPL-MCNC: 89 MG/DL (ref 60–99)
HDLC SERPL-MCNC: 65 MG/DL (ref 40–150)
LDLC SERPL CALC-MCNC: 88 MG/DL (ref 0–129)
POTASSIUM SERPL-SCNC: 4.34 MMOL/L (ref 3.5–5.3)
PROT SERPL-MCNC: 7.3 G/DL (ref 6.1–8.1)
SODIUM SERPL-SCNC: 137.6 MMOL/L (ref 135–146)
TRIGL SERPL-MCNC: 51 MG/DL (ref 0–149)

## 2025-05-12 PROCEDURE — 36415 COLL VENOUS BLD VENIPUNCTURE: CPT | Performed by: FAMILY MEDICINE

## 2025-05-12 PROCEDURE — 80053 COMPREHEN METABOLIC PANEL: CPT | Performed by: FAMILY MEDICINE

## 2025-05-12 PROCEDURE — 90715 TDAP VACCINE 7 YRS/> IM: CPT | Performed by: FAMILY MEDICINE

## 2025-05-12 PROCEDURE — 80061 LIPID PANEL: CPT | Performed by: FAMILY MEDICINE

## 2025-05-12 PROCEDURE — 99397 PER PM REEVAL EST PAT 65+ YR: CPT | Mod: 25 | Performed by: FAMILY MEDICINE

## 2025-05-12 PROCEDURE — 90471 IMMUNIZATION ADMIN: CPT | Performed by: FAMILY MEDICINE

## 2025-05-12 ASSESSMENT — ASTHMA QUESTIONNAIRES: ACT_TOTALSCORE: 24

## 2025-05-12 NOTE — PROGRESS NOTES
3  SUBJECTIVE:   CC: Sarthak Berrios is an 69 year old male who presents for preventive health visit.       Patient has been advised of split billing requirements and indicates understanding: Yes  Healthy Habits:  Do you get at least three servings of calcium containing foods daily (dairy, green leafy vegetables, etc.)? yes  Amount of exercise or daily activities, outside of work: 3 day(s) per week  Problems taking medications regularly No  Medication side effects: No  Have you had an eye exam in the past two years? yes  Do you see a dentist twice per year? yes  Do you have sleep apnea, excessive snoring or daytime drowsiness?no          Today's PHQ-2 Score:       5/12/2025     9:20 AM 10/8/2024    11:18 AM   PHQ-2 ( 1999 Pf[employer])   Q1: Little interest or pleasure in doing things 0 0   Q2: Feeling down, depressed or hopeless 0 0   PHQ-2 Score 0 0       Abuse: Current or Past(Physical, Sexual or Emotional)- No  Do you feel safe in your environment? Yes        Social History     Tobacco Use    Smoking status: Never     Passive exposure: Never    Smokeless tobacco: Never   Substance Use Topics    Alcohol use: Yes     Alcohol/week: 2.0 standard drinks of alcohol     Types: 2 Standard drinks or equivalent per week     If you drink alcohol do you typically have >3 drinks per day or >7 drinks per week? No                      Last PSA:   [facility] PSA   Date Value Ref Range Status   06/01/2023 3.12 < OR = 4.00 ng/mL Final     Comment:     The total PSA value from this assay system is   standardized against the WHO standard. The test   result will be approximately 20% lower when compared   to the equimolar-standardized total PSA ([place]   [place]). Comparison of serial PSA results should be   interpreted with this fact in mind.     This test was performed using the Siemens   chemiluminescent method. Values obtained from   different assay methods cannot be used  interchangeably. PSA levels, regardless of  value, should not be  interpreted as absolute  evidence of the presence or absence of disease.         Reviewed orders with patient. Reviewed health maintenance and updated orders accordingly - Yes  BP Readings from Last 3 Encounters:   05/12/25 128/76   04/03/25 128/72   10/08/24 128/84    Wt Readings from Last 3 Encounters:   05/12/25 83.9 kg (185 lb)   04/03/25 85.7 kg (189 lb)   10/08/24 92.1 kg (203 lb)                  Patient Active Problem List   Diagnosis    Family history of malignant neoplasm of prostate    Osteoarthritis    FAMILY HX GI MALIGNANCY    Degenerative arthritis of knee    History of total bilateral knee replacement    Mild intermittent asthma without complication    Elevated blood pressure reading without diagnosis of hypertension    Morbid obesity (H)     Past Surgical History:   Procedure Laterality Date    ARTHROPLASTY KNEE Left 11/17/2015    Procedure: ARTHROPLASTY KNEE;  Surgeon: Alfie Ingram MD;  Location: [facility] OR    AS TOTAL KNEE ARTHROPLASTY Right     COLONOSCOPY N/A 1/16/2019    Procedure: COLONOSCOPY;  Surgeon: Paul Carvajal MD;  Location: [facility] GI    HC KNEE SCOPE, DIAGNOSTIC  1999    Arthroscopy, Knee   Right  Dr. Ingram    LIGATN/STRIP LONG OR SHORT SAPHEN  2000    [facility] COLONOSCOPY THRU STOMA, DIAGNOSTIC  2009    [facility] REPAIR UMBILICAL LILLIAM,5+Y/O,REDUC  1996       Social History     Tobacco Use    Smoking status: Never     Passive exposure: Never    Smokeless tobacco: Never   Substance Use Topics    Alcohol use: Yes     Alcohol/week: 2.0 standard drinks of alcohol     Types: 2 Standard drinks or equivalent per week     Family History   Problem Relation Age of Onset    Alcohol/Drug Father     Cancer Maternal Grandfather         Prostate    Cancer Brother         Bladder    Cardiovascular No family hx of     Diabetes No family hx of     Cancer - colorectal No family hx of     Breast Cancer No family hx of          Current Outpatient Medications   Medication Sig Dispense Refill    albuterol (ACCUNEB)  1.25 MG/3ML neb solution Take 1 vial (1.25 mg) by nebulization 3 times daily as needed for shortness of breath / dyspnea or wheezing 3 mL 3    albuterol (VENTOLIN HFA) 108 (90 Base) MCG/ACT inhaler INHALE 2 PUFFS INTO THE LUNGS EVERY 6 HOURS AS NEEDED FOR SHORTNESS OF BREATH OR DIFFICULT BREATHING 18 g 1    fluticasone (ARNUITY ELLIPTA) 100 MCG/ACT inhaler Inhale 1 puff into the lungs daily 30 each 1    multivitamin, therapeutic with minerals (MULTI-VITAMIN) TABS Take 1 tablet by mouth daily      tirzepatide-Weight Management (ZEPBOUND) 15 MG/0.5ML prefilled pen Inject 0.5 mLs (15 mg) subcutaneously every 7 days. 2 mL 0     Allergies   Allergen Reactions    No Known Allergies      Recent Labs   Lab Test 06/01/23  0000 11/30/21  1522 11/30/21  1521 01/11/18  1011   LDL 82 120  --  87   HDL 48 57  --  42   TRIG 68 116  --  63   ALT  --   --   --  79*   CR 1..02  --  1.03 1.17   GFRESTIMATED  --   --   --  67   POTASSIUM 4.34  --  4.95 4.4        Reviewed and updated as needed this visit by clinical staff   Tobacco  Allergies  Meds              Reviewed and updated as needed this visit by Provider                  Past Medical History:   Diagnosis Date    Arthritis     Family history of malignant neoplasm of prostate     MGF    FAMILY HX GI MALIGNANCY 9/23/2006    MGF had colon cancer    Mild persistent asthma 1/7/2015    Mild persistent asthma 1/7/2015    Other chronic pain     JOint pain for over 10 years    Uncomplicated asthma       Past Surgical History:   Procedure Laterality Date    ARTHROPLASTY KNEE Left 11/17/2015    Procedure: ARTHROPLASTY KNEE;  Surgeon: Alfie Ingram MD;  Location: [facility]    AS TOTAL KNEE ARTHROPLASTY Right     COLONOSCOPY N/A 1/16/2019    Procedure: COLONOSCOPY;  Surgeon: Paul Carvajal MD;  Location:  GI    HC KNEE SCOPE, DIAGNOSTIC  1999    Arthroscopy, Knee   Right  Dr. Juan Jose CARTER/STRIP LONG OR SHORT SAPHEN  2000    [facility] COLONOSCOPY THRU STOMA, DIAGNOSTIC  2009    [facility]  "REPAIR UMBILICAL LILLIAM,5+Y/O,REDUC  1996       ROS:  CONSTITUTIONAL: NEGATIVE for fever, chills, change in weight  INTEGUMENTARY/SKIN: NEGATIVE for worrisome rashes, moles or lesions  EYES: NEGATIVE for vision changes or irritation  ENT: NEGATIVE for ear, mouth and throat problems  RESP: NEGATIVE for significant cough or SOB  CV: NEGATIVE for chest pain, palpitations or peripheral edema  GI: NEGATIVE for nausea, abdominal pain, heartburn, or change in bowel habits   male: negative for dysuria, hematuria, decreased urinary stream, erectile dysfunction, urethral discharge  MUSCULOSKELETAL: NEGATIVE for significant arthralgias or myalgia  NEURO: NEGATIVE for weakness, dizziness or paresthesias  PSYCHIATRIC: NEGATIVE for changes in mood or affect    OBJECTIVE:   /76 (BP Location: Left arm, Patient Position: Sitting, Cuff Size: Adult Large)   Pulse 80   Ht 1.753 m (5' 9\")   Wt 83.9 kg (185 lb)   SpO2 98%   BMI 27.32 kg/m    EXAM:  GENERAL: alert and no distress  EYES: Eyes grossly normal to inspection, PERRL and conjunctivae and sclerae normal  HENT: ear canals and TM's normal, nose and mouth without ulcers or lesions  NECK: no adenopathy, no asymmetry, masses, or scars  RESP: lungs clear to auscultation - no rales, rhonchi or wheezes  CV: regular rate and rhythm, normal S1 S2, no S3 or S4, no murmur, click or rub, no peripheral edema  ABDOMEN: soft, nontender, no hepatosplenomegaly, no masses and bowel sounds normal   (male): normal male genitalia without lesions or urethral discharge, no hernia  MS: no gross musculoskeletal defects noted, no edema  SKIN: no suspicious lesions or rashes  NEURO: Normal strength and tone, mentation intact and speech normal  PSYCH: mentation appears normal, affect normal/bright    Diagnostic Test Results:  Labs reviewed in Epic    ASSESSMENT/PLAN:   (Z00.00) Routine general medical examination at a health care facility  (primary encounter diagnosis)  Comment: discussed " "preventitive healthcare   Plan: Continue to work on healthy diet and exercise, discussed healthy habits     (J45.30) Mild persistent asthma without complication  Comment: well controlled  Plan: Asthma Action Plan (AAP)        continue current medications at current doses     (R03.0) Elevated blood pressure reading without diagnosis of hypertension  Comment: resolved with weight loss  Plan:     (E66.01) Morbid obesity (H)  Comment: down 62 pounds  Plan: Lipid Panel ([facility]), Comprehensive Metobolic         Panel (BFP), VENOUS COLLECTION        Continue to work on healthy diet and exercise, discussed healthy habits     (N52.9) Erectile dysfunction, unspecified erectile dysfunction type  Comment: disucssed options, He is using [employer]-   Plan: ok to request meds here as well    (Z12.5) Special screening for malignant neoplasm of prostate  Comment:   Plan: PSA Total ([facility]), VENOUS COLLECTION            (Z23) Need for vaccination  Comment:   Plan: TDAP VACCINE (Adacel, Boostrix)  [[id number]]            Patient has been advised of split billing requirements and indicates understanding: Yes  COUNSELING:  Reviewed preventive health counseling, as reflected in patient instructions       Regular exercise       Healthy diet/nutrition       Vision screening       Immunizations  Vaccinated for: TDAP           Colorectal cancer screening       Prostate cancer screening    Estimated body mass index is 27.32 kg/m  as calculated from the following:    Height as of this encounter: 1.753 m (5' 9\").    Weight as of this encounter: 83.9 kg (185 lb).    Weight management plan: Discussed healthy diet and exercise guidelines    He reports that he has never smoked. He has never been exposed to tobacco smoke. He has never used smokeless tobacco.      Counseling Resources:  ATP IV Guidelines  Pooled Cohorts Equation Calculator  FRAX Risk Assessment  ICSI Preventive Guidelines  Dietary Guidelines for Americans, 2010  USDA's MyPlate  ASA " Prophylaxis  Lung CA Screening    Paul Styles MD  [facility]

## 2025-05-12 NOTE — LETTER
My Asthma Action Plan    Name: Sarthak Berrios   YOB: 1955  Date: 5/12/2025   My doctor: Paul Styles MD   My clinic: [facility]        My Rescue Medicine: Albuterol (Proair/Ventolin/Proventil HFA) 2-4 puffs EVERY 4 HOURS as needed. Use a spacer if recommended by your provider.   My Asthma Severity:   Intermittent / Exercise Induced  Know your asthma triggers: upper respiratory infections and pollens  pollens          GREEN ZONE   Good Control  I feel good  No cough or wheeze  Can work, sleep and play without asthma symptoms       Take your asthma control medicine every day.     If exercise triggers your asthma, take your rescue medication  15 minutes before exercise or sports, and  During exercise if you have asthma symptoms  Spacer to use with inhaler: If you have a spacer, make sure to use it with your inhaler             YELLOW ZONE Getting Worse  I have ANY of these:  I do not feel good  Cough or wheeze  Chest feels tight  Wake up at night   Keep taking your Green Zone medications  Start taking your rescue medicine:  every 20 minutes for up to 1 hour. Then every 4 hours for 24-48 hours.  If you stay in the Yellow Zone for more than 12-24 hours, contact your doctor.  If you do not return to the Green Zone in 12-24 hours or you get worse, start taking your oral steroid medicine if prescribed by your provider.           RED ZONE Medical Alert - Get Help  I have ANY of these:  I feel awful  Medicine is not helping  Breathing getting harder  Trouble walking or talking  Nose opens wide to breathe       Take your rescue medicine NOW  If your provider has prescribed an oral steroid medicine, start taking it NOW  Call your doctor NOW  If you are still in the Red Zone after 20 minutes and you have not reached your doctor:  Take your rescue medicine again and  Call 911 or go to the emergency room right away    See your regular doctor within 2 weeks of an Emergency Room or Urgent  Care visit for follow-up treatment.          Annual Reminders:  Meet with Asthma Educator,  Flu Shot in the Fall, consider Pneumonia Vaccination for patients with asthma (aged 19 and older).    Pharmacy:    [id number] - [place], [place] - [address] [facility] [address]  [id number] - [place], [place] - [address] [facility] [address] AT [facility] [address] [facility] [address]    Electronically signed by Paul Styles MD   Date: 05/12/25                    Asthma Triggers  How To Control Things That Make Your Asthma Worse    Triggers are things that make your asthma worse.  Look at the list below to help you find your triggers and   what you can do about them. You can help prevent asthma flare-ups by staying away from your triggers.      Trigger                                                          What you can do   Cigarette Smoke  Tobacco smoke can make asthma worse. Do not allow smoking in your home, car or around you.  Be sure no one smokes at a child s day care or school.  If you smoke, ask your health care provider for ways to help you quit.  Ask family members to quit too.  Ask your health care provider for a referral to Quit Plan to help you quit smoking, or call 7-827-754-PLAN.     Colds, Flu, Bronchitis  These are common triggers of asthma. Wash your hands often.  Don t touch your eyes, nose or mouth.  Get a flu shot every year.     Dust Mites  These are tiny bugs that live in cloth or carpet. They are too small to see. Wash sheets and blankets in hot water every week.   Encase pillows and mattress in dust mite proof covers.  Avoid having carpet if you can. If you have carpet, vacuum weekly.   Use a dust mask and HEPA vacuum.   Pollen and Outdoor Mold  Some people are allergic to trees, grass, or weed pollen, or molds. Try to keep your windows closed.  Limit time out doors when pollen count is high.   Ask you health care provider about taking medicine during allergy season.      Animal Dander  Some people are allergic to skin flakes, urine or saliva from pets with fur or feathers. Keep pets with fur or feathers out of your home.    If you can t keep the pet outdoors, then keep the pet out of your bedroom.  Keep the bedroom door closed.  Keep pets off cloth furniture and away from stuffed toys.     Mice, Rats, and Cockroaches  Some people are allergic to the waste from these pests.   Cover food and garbage.  Clean up spills and food crumbs.  Store grease in the refrigerator.   Keep food out of the bedroom.   Indoor Mold  This can be a trigger if your home has high moisture. Fix leaking faucets, pipes, or other sources of water.   Clean moldy surfaces.  Dehumidify basement if it is damp and smelly.   Smoke, Strong Odors, and Sprays  These can reduce air quality. Stay away from strong odors and sprays, such as perfume, powder, hair spray, paints, smoke incense, paint, cleaning products, candles and new carpet.   Exercise or Sports  Some people with asthma have this trigger. Be active!  Ask your doctor about taking medicine before sports or exercise to prevent symptoms.    Warm up for 5-10 minutes before and after sports or exercise.     Other Triggers of Asthma  Cold air:  Cover your nose and mouth with a scarf.  Sometimes laughing or crying can be a trigger.  Some medicines and food can trigger asthma.

## 2025-05-12 NOTE — NURSING NOTE
Chief Complaint   Patient presents with    Physical     Annual, fasting      Pre-visit Screening:  Immunizations:  not up to date - due for tdap   Colonoscopy:  is up to date  Mammogram: na  Asthma Action Test/Plan:  na  PHQ9:  phq2 done today   GAD7:  na  Questioned patient about current smoking habits Pt. has never smoked.  Ok to leave detailed message on voice mail for today's visit only yes, phone # [phone] (home)

## 2025-05-13 ENCOUNTER — TELEPHONE (OUTPATIENT)
Dept: FAMILY MEDICINE | Facility: CLINIC | Age: 70
End: 2025-05-13

## 2025-05-13 LAB — ABBOTT PSA - QUEST: 5.61 NG/ML

## 2025-05-13 NOTE — TELEPHONE ENCOUNTER
FYI - Status Update    Who is Calling: patient    Update: Patient tried messaging the doctor back on [facility] about his lab results but it was not going through. Patient wants to know if he can get a call.    Does caller want a call/response back: Yes     Could we send this information to you in [employer] or would you prefer to receive a phone call?:   Patient would prefer a phone call   Okay to leave a detailed message?: Yes at Cell number on file:    Telephone Information:   Mobile [phone]

## 2025-05-19 ENCOUNTER — PATIENT OUTREACH (OUTPATIENT)
Dept: CARE COORDINATION | Facility: CLINIC | Age: 70
End: 2025-05-19
Payer: COMMERCIAL

## 2025-05-22 ENCOUNTER — PATIENT OUTREACH (OUTPATIENT)
Dept: CARE COORDINATION | Facility: CLINIC | Age: 70
End: 2025-05-22
Payer: COMMERCIAL

## 2025-06-12 ENCOUNTER — OFFICE VISIT (OUTPATIENT)
Dept: FAMILY MEDICINE | Facility: CLINIC | Age: 70
End: 2025-06-12

## 2025-06-12 VITALS
BODY MASS INDEX: 27.62 KG/M2 | TEMPERATURE: 97.8 F | HEART RATE: 83 BPM | DIASTOLIC BLOOD PRESSURE: 78 MMHG | OXYGEN SATURATION: 97 % | WEIGHT: 187 LBS | SYSTOLIC BLOOD PRESSURE: 126 MMHG

## 2025-06-12 DIAGNOSIS — K64.4 EXTERNAL HEMORRHOIDS: Primary | ICD-10-CM

## 2025-06-12 NOTE — PROGRESS NOTES
"  Assessment & Plan     External hemorrhoids-medium sized hemorrhoid on exam. Will treat conservatively for now  Increase fiber, avoid excess sitting/straining on toilet, Sitz baths BID, Preparation H wipes PRN  Call if worsening/no improvement-willing to refer to colon and rectal         Follow up as needed    Subjective   Sarthak is a 69 year old, presenting for the following health issues:  Mass (Lump/mass on buttocks, discomfort when sitting or wiping, feels \"raw\" )    HPI      Pt presents with lump around anus. Was wiping 2 days ago, felt a small lump around anus. Anus also felt raw and uncomfortable. This lump has gotten smaller over the last few days. No pain, but can still feel this. No other symptoms with this. BM normal overall. Did have some on and off constipation with Zepbound-improved now. Hasn't had anything like this before. UTD colonoscopy.    Uses Dulcolax and Benefiber for Bms.    Review of Systems  Constitutional, neuro, ENT, endocrine, pulmonary, cardiac, gastrointestinal, genitourinary, musculoskeletal, integument and psychiatric systems are negative, except as otherwise noted.      Objective    /78 (BP Location: Right arm, Patient Position: Sitting, Cuff Size: Adult Large)   Pulse 83   Temp 97.8  F (36.6  C) (Temporal)   Wt 84.8 kg (187 lb)   SpO2 97%   BMI 27.62 kg/m    Body mass index is 27.62 kg/m .  Physical Exam   GENERAL: alert and no distress  NECK: no adenopathy, no asymmetry, masses, or scars  RESP: lungs clear to auscultation - no rales, rhonchi or wheezes  CV: regular rate and rhythm, normal S1 S2, no S3 or S4, no murmur, click or rub, no peripheral edema  ABDOMEN: soft, nontender, no hepatosplenomegaly, no masses and bowel sounds normal  RECTAL (male): normal sphincter tone, external hemorrhoid noted 1.5cm, easily reduced  MS: no gross musculoskeletal defects noted, no edema            Signed Electronically by: Chaka Polanco PA-C      "

## 2025-06-12 NOTE — NURSING NOTE
"Chief Complaint   Patient presents with    Mass     Lump/mass on buttocks, discomfort when sitting or wiping, feels \"raw\"      Pre-visit Screening:  Immunizations:  up to date  Colonoscopy:  is up to date  Mammogram: NA  Asthma Action Test/Plan:  NA  PHQ9:  NA  GAD7:  NA  Questioned patient about current smoking habits Pt. has never smoked.  Ok to leave detailed message on voice mail for today's visit only Yes, phone # [phone]    "

## (undated) DEVICE — KIT ENDO TURNOVER/PROCEDURE W/CLEAN A SCOPE LINERS 103888

## (undated) RX ORDER — FENTANYL CITRATE 50 UG/ML
INJECTION, SOLUTION INTRAMUSCULAR; INTRAVENOUS
Status: DISPENSED
Start: 2019-01-16